# Patient Record
Sex: FEMALE | Race: WHITE
[De-identification: names, ages, dates, MRNs, and addresses within clinical notes are randomized per-mention and may not be internally consistent; named-entity substitution may affect disease eponyms.]

---

## 2019-10-09 ENCOUNTER — HOSPITAL ENCOUNTER (OUTPATIENT)
Dept: HOSPITAL 88 - US | Age: 79
End: 2019-10-09
Attending: UROLOGY
Payer: MEDICARE

## 2019-10-09 DIAGNOSIS — N39.0: Primary | ICD-10-CM

## 2019-10-09 PROCEDURE — 74018 RADEX ABDOMEN 1 VIEW: CPT

## 2019-10-09 PROCEDURE — 76770 US EXAM ABDO BACK WALL COMP: CPT

## 2019-10-09 NOTE — DIAGNOSTIC IMAGING REPORT
Exam: KUB - 2 views



Indication: Urinary tract infection



Comparison: CT chest abdomen pelvis of 6/26/2011



Findings: 

No radiographically apparent renal calculi. Extensive stool burden throughout

the colon. Status post cholecystectomy. No free air. Extensive degenerative

changes of the lumbar spine. Phleboliths in the pelvis.



Impression:

No radiographically apparent renal calculi



Signed by: Cedrick Freeman MD on 10/9/2019 2:10 PM

## 2019-10-09 NOTE — DIAGNOSTIC IMAGING REPORT
EXAM: Renal Ultrasound

INDICATION:        

^URINARY TRACT INFECTION  

COMPARISON: CT chest abdomen and pelvis of 6/26/2011 

TECHNIQUE: Transverse and longitudinal images of the kidneys and bladder were

obtained.  



FINDINGS:     



Right Kidney: 

Length: 8.0 cm

Appearance: Normal echogenicity.  

Collecting system: No hydronephrosis

Stones: None

Cyst/Mass: None



Left Kidney: 

Length: 9.4 cm

Appearance: Normal echogenicity.  

Collecting system: No hydronephrosis

Stones: None

Cyst/Mass: None



Bladder: 

No mass or calculi. Prevoid volume estimate of 95 cc.



IMPRESSION:

No renal calculi or hydronephrosis.



Signed by: Cedrick Freeman MD on 10/9/2019 2:11 PM

## 2019-11-09 ENCOUNTER — HOSPITAL ENCOUNTER (INPATIENT)
Dept: HOSPITAL 88 - ER | Age: 79
LOS: 3 days | Discharge: HOME | DRG: 392 | End: 2019-11-12
Attending: INTERNAL MEDICINE | Admitting: INTERNAL MEDICINE
Payer: MEDICARE

## 2019-11-09 VITALS — BODY MASS INDEX: 29.45 KG/M2 | WEIGHT: 150 LBS | HEIGHT: 60 IN

## 2019-11-09 VITALS — SYSTOLIC BLOOD PRESSURE: 183 MMHG | DIASTOLIC BLOOD PRESSURE: 75 MMHG

## 2019-11-09 VITALS — DIASTOLIC BLOOD PRESSURE: 75 MMHG | SYSTOLIC BLOOD PRESSURE: 183 MMHG

## 2019-11-09 VITALS — DIASTOLIC BLOOD PRESSURE: 71 MMHG | SYSTOLIC BLOOD PRESSURE: 170 MMHG

## 2019-11-09 DIAGNOSIS — K57.20: Primary | ICD-10-CM

## 2019-11-09 DIAGNOSIS — Z88.5: ICD-10-CM

## 2019-11-09 DIAGNOSIS — J44.9: ICD-10-CM

## 2019-11-09 DIAGNOSIS — Z88.1: ICD-10-CM

## 2019-11-09 DIAGNOSIS — Z88.8: ICD-10-CM

## 2019-11-09 DIAGNOSIS — I10: ICD-10-CM

## 2019-11-09 DIAGNOSIS — F41.9: ICD-10-CM

## 2019-11-09 LAB
ALBUMIN SERPL-MCNC: 4.2 G/DL (ref 3.5–5)
ALBUMIN/GLOB SERPL: 1.4 {RATIO} (ref 0.8–2)
ALP SERPL-CCNC: 111 IU/L (ref 40–150)
ALT SERPL-CCNC: 15 IU/L (ref 0–55)
ANION GAP SERPL CALC-SCNC: 14.8 MMOL/L (ref 8–16)
BACTERIA URNS QL MICRO: (no result) /HPF
BASOPHILS # BLD AUTO: 0.1 10*3/UL (ref 0–0.1)
BASOPHILS NFR BLD AUTO: 0.6 % (ref 0–1)
BILIRUB UR QL: NEGATIVE
BUN SERPL-MCNC: 17 MG/DL (ref 7–26)
BUN/CREAT SERPL: 20 (ref 6–25)
CALCIUM SERPL-MCNC: 10.8 MG/DL (ref 8.4–10.2)
CHLORIDE SERPL-SCNC: 102 MMOL/L (ref 98–107)
CK MB SERPL-MCNC: 1.3 NG/ML (ref 0–5)
CK SERPL-CCNC: 34 IU/L (ref 29–168)
CLARITY UR: (no result)
CO2 SERPL-SCNC: 26 MMOL/L (ref 22–29)
COLOR UR: YELLOW
DEPRECATED NEUTROPHILS # BLD AUTO: 5 10*3/UL (ref 2.1–6.9)
DEPRECATED RBC URNS MANUAL-ACNC: (no result) /HPF (ref 0–5)
EGFRCR SERPLBLD CKD-EPI 2021: > 60 ML/MIN (ref 60–?)
EOSINOPHIL # BLD AUTO: 0.2 10*3/UL (ref 0–0.4)
EOSINOPHIL NFR BLD AUTO: 2.9 % (ref 0–6)
EPI CELLS URNS QL MICRO: (no result) /LPF
ERYTHROCYTE [DISTWIDTH] IN CORD BLOOD: 12.8 % (ref 11.7–14.4)
GLOBULIN PLAS-MCNC: 3 G/DL (ref 2.3–3.5)
GLUCOSE SERPLBLD-MCNC: 92 MG/DL (ref 74–118)
HCT VFR BLD AUTO: 48 % (ref 34.2–44.1)
HGB BLD-MCNC: 15.6 G/DL (ref 12–16)
KETONES UR QL STRIP.AUTO: NEGATIVE
LEUKOCYTE ESTERASE UR QL STRIP.AUTO: (no result)
LYMPHOCYTES # BLD: 2.2 10*3/UL (ref 1–3.2)
LYMPHOCYTES NFR BLD AUTO: 26.2 % (ref 18–39.1)
MCH RBC QN AUTO: 28.9 PG (ref 28–32)
MCHC RBC AUTO-ENTMCNC: 32.5 G/DL (ref 31–35)
MCV RBC AUTO: 89.1 FL (ref 81–99)
MONOCYTES # BLD AUTO: 0.8 10*3/UL (ref 0.2–0.8)
MONOCYTES NFR BLD AUTO: 10 % (ref 4.4–11.3)
NEUTS SEG NFR BLD AUTO: 59.8 % (ref 38.7–80)
NITRITE UR QL STRIP.AUTO: POSITIVE
PLATELET # BLD AUTO: 297 X10E3/UL (ref 140–360)
POTASSIUM SERPL-SCNC: 3.8 MMOL/L (ref 3.5–5.1)
PROT UR QL STRIP.AUTO: NEGATIVE
RBC # BLD AUTO: 5.39 X10E6/UL (ref 3.6–5.1)
SODIUM SERPL-SCNC: 139 MMOL/L (ref 136–145)
SP GR UR STRIP: 1.01 (ref 1.01–1.02)
UROBILINOGEN UR STRIP-MCNC: 0.2 MG/DL (ref 0.2–1)
WBC #/AREA URNS HPF: (no result) /HPF (ref 0–5)

## 2019-11-09 PROCEDURE — 85025 COMPLETE CBC W/AUTO DIFF WBC: CPT

## 2019-11-09 PROCEDURE — 81001 URINALYSIS AUTO W/SCOPE: CPT

## 2019-11-09 PROCEDURE — 84484 ASSAY OF TROPONIN QUANT: CPT

## 2019-11-09 PROCEDURE — 82550 ASSAY OF CK (CPK): CPT

## 2019-11-09 PROCEDURE — 99284 EMERGENCY DEPT VISIT MOD MDM: CPT

## 2019-11-09 PROCEDURE — 82553 CREATINE MB FRACTION: CPT

## 2019-11-09 PROCEDURE — 74176 CT ABD & PELVIS W/O CONTRAST: CPT

## 2019-11-09 PROCEDURE — 83690 ASSAY OF LIPASE: CPT

## 2019-11-09 PROCEDURE — 80053 COMPREHEN METABOLIC PANEL: CPT

## 2019-11-09 PROCEDURE — 36415 COLL VENOUS BLD VENIPUNCTURE: CPT

## 2019-11-09 PROCEDURE — 94640 AIRWAY INHALATION TREATMENT: CPT

## 2019-11-09 RX ADMIN — CLONIDINE HYDROCHLORIDE PRN MG: 0.1 TABLET ORAL at 21:30

## 2019-11-09 RX ADMIN — SODIUM CHLORIDE SCH MLS/HR: 9 INJECTION, SOLUTION INTRAVENOUS at 21:25

## 2019-11-09 RX ADMIN — BRIMONIDINE TARTRATE SCH ML: 1.5 SOLUTION/ DROPS OPHTHALMIC at 21:00

## 2019-11-09 RX ADMIN — TAZOBACTAM SODIUM AND PIPERACILLIN SODIUM SCH MLS/HR: 375; 3 INJECTION, SOLUTION INTRAVENOUS at 23:42

## 2019-11-09 RX ADMIN — BRINZOLAMIDE SCH ML: 10 SUSPENSION/ DROPS OPHTHALMIC at 21:00

## 2019-11-09 RX ADMIN — TAZOBACTAM SODIUM AND PIPERACILLIN SODIUM SCH MLS/HR: 375; 3 INJECTION, SOLUTION INTRAVENOUS at 18:11

## 2019-11-09 RX ADMIN — ATENOLOL SCH MG: 50 TABLET ORAL at 21:30

## 2019-11-09 NOTE — XMS REPORT
Patient Summary Document

                             Created on: 2019



BRAN SMART

External Reference #: [id number]

: 1940

Sex: Female



Demographics







                          Address                   [address] [place]  [address]

 

                          Home Phone                [phone]

 

                          Preferred Language        Unknown

 

                          Marital Status            Unknown

 

                          Confucianism Affiliation     Unknown

 

                          Race                      Unknown

 

                                        Additional Race(s)  

 

                          Ethnic Group              Unknown





Author







                          Author                    [facility]

 

                          [facility]

 

                          Address                   Unknown

 

                          Phone                     Unavailable







Support







                Name            Relationship    Address         Phone

 

                    CRISTIN MATTHEWS    PRS                 [address], [place]  [address] [phone]

 

                    BRAD SMART       PRS                 [address], [place]  [address] [phone]







Care Team Providers







                    Care Team Member Name    Role                Phone

 

                    BRENDA SILVA        Unavailable         Unavailable







Payers







             Payer Name    Policy Type    Policy Number    Effective Date    Expiration Date







Problems

This patient has no known problems.



Allergies, Adverse Reactions, Alerts







          Allergy Name    Allergy Type    Status    Severity    Reaction(s)    Onset Date    Inactive 

Date                      Treating Clinician        Comments

 

        albuterol    DA      Active    SV              2018 00:00:00                     

 

        ciprofloxacin    DA      Active    SV              2018 00:00:00                     

 

        salmeterol    DA      Active    SV              2018 00:00:00                     

 

        fluticasone    DA      Active    SV              2018 00:00:00                     

 

        formoterol    DA      Active    SV              2018 00:00:00                     

 

        erythromycin base    DA      Active    MI              2017 00:00:00                     

 

        morphine    DA      Active    SV              2016 00:00:00                     

 

        benzalkonium chloride    DA      Active    SV              2016 00:00:00                     

 

        levofloxacin    DA      Active    SV              2016 00:00:00                     







Medications

This patient has no known medications.



Results







           Test Description    Test Time    Test Comments    Text Results    Atomic Results    Result

 Comments

 

                US RENAL RETROPERITONEAL COMP    2019-10-09 14:10:00                                             

                                                             [address] [address]      Patient Name: BRAN SMART                        
          MR #: [id number]                     : 1940                  
                Age/Sex: 78/F  Acct #: [id number]                             
Req #: [id number]  Adm Physician:                                               
      Ordered by: BRENDA SILVA MD                            Report #: [id number] 
      Location: US                                      Room/Bed:               
     
___________________________________________________________________________________________________
   Procedure: [id number] US/US RENAL RETROPERITONEAL COMP  Exam Date:            
                Exam Time:                                               REPORT 
STATUS: Signed    EXAM: Renal Ultrasound   INDICATION:            URINARY TRACT 
INFECTION     COMPARISON: CT chest abdomen and pelvis of 2011    TECHNIQUE:
Transverse and longitudinal images of the kidneys and bladder were   obtained.  
     FINDINGS:           Right Kidney:    Length: 8.0 cm   Appearance: Normal 
echogenicity.     Collecting system: No hydronephrosis   Stones: None   
Cyst/Mass: None      Left Kidney:    Length: 9.4 cm   Appearance: Normal 
echogenicity.     Collecting system: No hydronephrosis   Stones: None   
Cyst/Mass: None      Bladder:    No mass or calculi. Prevoid volume estimate of 
95 cc.      IMPRESSION:   No renal calculi or hydronephrosis.      Signed by: 
Nicolette Craven MD on 10/9/2019 2:11 PM        Dictated By: NICOLETTE CRAVEN MD  
Electronically Signed By: NICOLETTE CRAVEN MD on 10/09/19 1411  Transcribed By: 
CHALINO on 10/09/19 1411       COPY TO:   BRENDA SILVA MD              

 

                [facility] (KUB)    2019-10-09 14:08:00                                                       

                                                   [place] [address]      Patient Name: BRAN SMART                                  
MR #: [id number]                     : 1940                            
      Age/Sex: 78/F  Acct #: [id number]                              Req #: 
[id number]  Adm Physician:                                                      
Ordered by: BRENDA SILVA MD                            Report #: [id number]       
Location:                                       Room/Bed:                     
___________________________________________________________________________________________________
   Procedure: [id number] DX/ABDOMEN-1VIEW (KUB)  Exam Date:                      
      Exam Time:                                               REPORT STATUS: 
Signed    Exam: KUB - 2 views      Indication: Urinary tract infection      Com
parison: CT chest abdomen pelvis of 2011      Findings:    No 
radiographically apparent renal calculi. Extensive stool burden throughout   the
colon. Status post cholecystectomy. No free air. Extensive degenerative   
changes of the lumbar spine. Phleboliths in the pelvis.      Impression:   No 
radiographically apparent renal calculi      Signed by: Nicolette Craven MD on 
10/9/2019 2:10 PM        Dictated By: NICOLETTE CRAVEN MD  Electronically Signed By: 
NICOLETTE CRAVEN MD on 10/09/19 1410  Transcribed By: CHALINO on 10/09/19 1410       
COPY TO:   BRENDA SILVA MD                 

 

                [facility]         2018 14:29:00                    --------------------------------------------------------------------------------------------RUN

 DATE: 18                         [facility]                          
PAGE 1   RUN TIME: 1429                            Specimen Inquiry             
      RUN USER: INTERFACE                                                       
   ----------------------------------------------------------------
----------------------------PATIENT: BRAN SMART       ACCT #: 
[id number] LOC:  NIGELU      U #: [id number]                                   
   AGE/SX: 77/F         ROOM:            RE18REG DR:  Audra Davis MD        :    [birth year]     BED:             DIS:                         
                      [place] [facility]      TLOC:           
----------------------------
---------------------------------------------------------------- SPEC #: 
[id number]     RECD:      STATUS:  NEO THOMAS #: 
[id number]                           MIKAEL:  DR: 
Audra Davis MD         ENTERED:      SP TYPE: STOMACH      
 OTHR DR: Earl Boyd MD            ORDERED:  GROSS                         
                                                    COPIES TO:   Earl Boyd MD   [address]   [place] [address]   [phone]    
Audra Davis MD   [address]   [place] [address]   [phone] PROCEDUR
ES: GROSS () TISSUES:           1. DUODENUM, NOS - BX         2. 
ANTRAL BIOPSY - H-PYLORI         3. ESOPHAGUS, NOS - BX         4. CECUM, NOS - 
BX         CLINICAL HISTORY    COLLECTION DATE:  2018       ROUTINE EXAM;  
HISTORY OF GERD   POST-OP DIAGNOSIS: DUODENITIS/HIATAL HERNIA/DIVERTICULOSIS    
    FINAL DIAGNOSIS    Small intestine, duodenum, cold biopsy:        SMALL 
INTESTINAL MUCOSA WITH EDEMATOUS CHANGES AND MILD TO MODERATE           INCREASE
IN CHRONIC INFLAMMATION        NO BLUNTING OF THE VILLI IDENTIFIED        NO 
ACUTE INFLAMMATORY INFILTRATES IDENTIFIED        NEGATIVE FOR MALIGNANCY        
CLINICALLY CORRELATE           Stomach, antrum/body, biopsy:        CHRONIC 
GASTRITIS WITH TWO PROMINENT LYMPHOID AGGREGATES        CONTROLLED GIEMSA STAIN 
NEGATIVE FOR HELICOBACTER ORGANISMS        NEGATIVE FOR INTESTINAL METAPLASIA 
AND DYSPLASIA        NEGATIVE FOR MALIGNANCY                                ** 
CONTINUED ON NEXT PAGE ** --------------------------------------
------------------------------------------------------RUN DATE: 18        
                [facility]                          PAGE 2   RUN TIME: 1429 
                          Specimen Inquiry                    RUN USER: 
INTERFACE                                                           
--------------------------------------------------------------------------------------------SPEC
#: :[id number]    PATIENT: BRAN SMART        #[id number]  
(Continued)--------------------------------------------------------------------------------------------
        FINAL DIAGNOSIS           (Continued)         CLINICALLY CORRELATE      
Lower esophagus, cold biopsy:        SQUAMOUS EPITHELIUM WITH RARE 
INTRAEPITHELIAL NEUTROPHILS        GASTRIC-TYPE MUCOSA WITH MODERATE CHRONIC IN
FLAMMATION AND FOCAL          ACUTE INFLAMMATION        NEGATIVE FOR INTESTINAL 
METAPLASIA AND DYSPLASIA        NEGATIVE FOR MALIGNANCY        CLINICALLY 
CORRELATE       Cecum polyp, cold biopsy:        COLONIC MUCOSA WITH A SMALL 
LYMPHOID AGGREGATE AND FOCAL HYPERPLASTIC          CHANGE SUGGESTIVE OF 
HYPERPLASTIC POLYP         NEGATIVE FOR MALIGNANCY       ODETTE/sm   D   [phone] 
[phone]           MACROSCOPIC    The first specimen is received in formalin, 
labeled with the patient's name,   identified as "Duodenum for duodenitis", and 
consists of red-pink biopsy tissue   measuring up to 0.35 cm in aggregate.  It 
is submitted as (1).       The second specimen is received in formalin, labeled 
with the patient's name,   identified as "Antrum/body", and consists of pink-tan
biopsy tissue measuring   up to 0.4 cm in aggregate.  It is submitted as (2) for
H E and Giemsa stains.       The third specimen is received in formalin, labeled
with the patient's name,   identified as "Lower esophagus cold BX", and consists
of pink biopsy tissue   measuring up to 0.3 cm in aggregate.  It is submitted as
(3).       The fourth specimen is received in formalin, labeled with the 
patient's name,   identified as "Cecum polyp cold BX", and consists of light tan
biopsy tissue   measuring up to 0.3 cm in aggregate.  It is submitted as (4).   
   GROSS PERFORMED AT [facility]   [address],   [place] [address]   [phone]                                 
   ** CONTINUED ON NEXT PAGE ** 
--------------------------------------------------------------------------------------
------RUN DATE: 18                         [facility]                 
        PAGE 3   RUN TIME: 1429                            Specimen Inquiry     
              RUN USER: INTERFACE                                               
           
--------------------------------------------------------------------------------------------SPEC
#: [id number]    PATIENT: SMARTBRANDELORES MASSEY        #[id number]  
(Continued)--------------------------------------------------------------------------------------------
           MICROSCOPIC    MICROSCOPIC PERFORMED AT [facility]       All 
of the stains, including any controls performed, stain appropriately.       
[facility]   [address],   [place] [address]   
[phone]         PERFORMING SITE    Diagnosis performed at:        
[facility]        [address] 
[place] [address]        
[phone]---------------------------------------------------------------------------
----------------- Signed SIGNATURE ON FILE                        RADAMES 
AbiCarlita 18 1429  
--------------------------------------------------------------------------------------------
                                                             ** END OF REPORT **

## 2019-11-09 NOTE — NUR
Patient visited in room during nursing rounds. Patient alert and oriented x3. Patient 
ambulatory prn. Pt requesting to take home meds tonight especially blood pressure meds. Dr. Boyd to be called tonight. Pt on scheduled IV antibiotics. Call bell within reach.

## 2019-11-09 NOTE — DIAGNOSTIC IMAGING REPORT
EXAM: CT Abdomen and Pelvis WITHOUT contrast  

INDICATION:      

^abd pain

^[id number]

^165 

COMPARISON: Renal ultrasound dated 10/9/2019 and CT dated 6/26/2011

TECHNIQUE: Abdomen and pelvis were scanned utilizing a multidetector helical

scanner from the lung base to the pubic symphysis without administration of IV

contrast. Absence of intravenous contrast decreases sensitivity for detection

of focal lesions and vascular pathology. Coronal and sagittal reformations were

obtained. Routine protocol was performed. 

     IV CONTRAST: None

     ORAL CONTRAST: Water

            

COMPLICATIONS: None



RADIATION DOSE:

     Total DLP: ... mGy*cm

     Estimated effective dose: (DLP x 0.015 x size factor) mSv

     CTDIvol has been reviewed. It is below the limits set by the Radiation

Protocol Committee (RPC).



FINDINGS:



LINES and TUBES: None.



LOWER THORAX:  Unremarkable



HEPATOBILIARY:      Unenhanced liver is unremarkable. No biliary ductal

dilation. 



GALLBLADDER: Surgically absent.



SPLEEN: No splenomegaly. 



PANCREAS: No focal masses or ductal dilatation.  



ADRENALS: No adrenal nodules    



KIDNEYS/URETERS:  No hydronephrosis. No cystic or solid mass lesions.  No

stones.



GI TRACT: No abnormal distention or evidence of bowel obstruction.  Mild wall

thickening and pericolonic stranding in left pelvis. Tiny adjacent gas bubble

such as in series 2, image 56. Appendix is normal. Large colonic stool burden.



PELVIC ORGANS/BLADDER: Unremarkable.



LYMPH NODES: No lymphadenopathy.



VESSELS: Mild to moderate aortoiliac atherosclerotic disease.



PERITONEUM / RETROPERITONEUM: No fluid.



BONES: Mild scoliosis and multilevel degenerative changes of lumbar spine.



SOFT TISSUES: Unremarkable.            



IMPRESSION: 

1.  Limited study without intravenous contrast.

2.  Mild sigmoid wall thickening and pericolonic fat stranding in left lower

quadrant/pelvis, representing mild diverticulitis. Tiny pericolonic foci of

air, could represent air within diverticula versus microperforation. No

evidence of abscess formation.



Signed by: Dr. Rajesh Molina MD on 11/9/2019 5:37 PM

## 2019-11-10 VITALS — SYSTOLIC BLOOD PRESSURE: 160 MMHG | DIASTOLIC BLOOD PRESSURE: 71 MMHG

## 2019-11-10 VITALS — SYSTOLIC BLOOD PRESSURE: 165 MMHG | DIASTOLIC BLOOD PRESSURE: 72 MMHG

## 2019-11-10 VITALS — DIASTOLIC BLOOD PRESSURE: 67 MMHG | SYSTOLIC BLOOD PRESSURE: 148 MMHG

## 2019-11-10 VITALS — SYSTOLIC BLOOD PRESSURE: 127 MMHG | DIASTOLIC BLOOD PRESSURE: 59 MMHG

## 2019-11-10 VITALS — SYSTOLIC BLOOD PRESSURE: 158 MMHG | DIASTOLIC BLOOD PRESSURE: 73 MMHG

## 2019-11-10 VITALS — DIASTOLIC BLOOD PRESSURE: 84 MMHG | SYSTOLIC BLOOD PRESSURE: 138 MMHG

## 2019-11-10 RX ADMIN — SODIUM CHLORIDE SCH MLS/HR: 9 INJECTION, SOLUTION INTRAVENOUS at 23:25

## 2019-11-10 RX ADMIN — BRIMONIDINE TARTRATE SCH ML: 1.5 SOLUTION/ DROPS OPHTHALMIC at 09:00

## 2019-11-10 RX ADMIN — AMOXICILLIN SCH MG: 250 CAPSULE ORAL at 13:38

## 2019-11-10 RX ADMIN — ATENOLOL SCH MG: 50 TABLET ORAL at 21:00

## 2019-11-10 RX ADMIN — SODIUM CHLORIDE SCH MLS/HR: 9 INJECTION, SOLUTION INTRAVENOUS at 13:16

## 2019-11-10 RX ADMIN — BRINZOLAMIDE SCH ML: 10 SUSPENSION/ DROPS OPHTHALMIC at 09:00

## 2019-11-10 RX ADMIN — TAZOBACTAM SODIUM AND PIPERACILLIN SODIUM SCH MLS/HR: 375; 3 INJECTION, SOLUTION INTRAVENOUS at 05:43

## 2019-11-10 RX ADMIN — BRIMONIDINE TARTRATE SCH ML: 1.5 SOLUTION/ DROPS OPHTHALMIC at 15:00

## 2019-11-10 RX ADMIN — BRIMONIDINE TARTRATE SCH ML: 1.5 SOLUTION/ DROPS OPHTHALMIC at 21:00

## 2019-11-10 RX ADMIN — METRONIDAZOLE SCH MG: 500 TABLET ORAL at 21:48

## 2019-11-10 RX ADMIN — BRINZOLAMIDE SCH ML: 10 SUSPENSION/ DROPS OPHTHALMIC at 21:00

## 2019-11-10 RX ADMIN — AMOXICILLIN SCH MG: 250 CAPSULE ORAL at 21:48

## 2019-11-10 RX ADMIN — BRINZOLAMIDE SCH ML: 10 SUSPENSION/ DROPS OPHTHALMIC at 15:00

## 2019-11-10 RX ADMIN — METRONIDAZOLE SCH MG: 500 TABLET ORAL at 13:38

## 2019-11-10 RX ADMIN — Medication PRN ML: at 07:20

## 2019-11-10 NOTE — NUR
received am report and rounds done. pt is alert and oob, no s/s of distress. call light 
within reach and instructed pt to call nurse for help.

## 2019-11-10 NOTE — NUR
Called answering service for Dr. Guajardo and informed of order for consult. Reason for 
consult is abdominal pain. Answering service to relay message to MD on call.

## 2019-11-10 NOTE — NUR
Dr. Boyd rounded and saw pt in room. MD aware of patient condition. MD ordered to change 
patient's diet to full liquid from clear liquid. MD also ordered to consult Dr. Guajardo (for 
Abd pain).

## 2019-11-10 NOTE — NUR
Patient visited in room during nursing rounds. Patient alert and oriented x3. Patient 
ambulatory prn. Pt refuses IVF to be continued and stated she has been drinking water a lot. 
Pt on scheduled PO antibiotics. Call bell within reach.

## 2019-11-11 VITALS — SYSTOLIC BLOOD PRESSURE: 151 MMHG | DIASTOLIC BLOOD PRESSURE: 68 MMHG

## 2019-11-11 VITALS — DIASTOLIC BLOOD PRESSURE: 63 MMHG | SYSTOLIC BLOOD PRESSURE: 133 MMHG

## 2019-11-11 VITALS — DIASTOLIC BLOOD PRESSURE: 63 MMHG | SYSTOLIC BLOOD PRESSURE: 135 MMHG

## 2019-11-11 VITALS — DIASTOLIC BLOOD PRESSURE: 76 MMHG | SYSTOLIC BLOOD PRESSURE: 200 MMHG

## 2019-11-11 VITALS — DIASTOLIC BLOOD PRESSURE: 86 MMHG | SYSTOLIC BLOOD PRESSURE: 225 MMHG

## 2019-11-11 VITALS — SYSTOLIC BLOOD PRESSURE: 128 MMHG | DIASTOLIC BLOOD PRESSURE: 64 MMHG

## 2019-11-11 VITALS — DIASTOLIC BLOOD PRESSURE: 81 MMHG | SYSTOLIC BLOOD PRESSURE: 187 MMHG

## 2019-11-11 VITALS — SYSTOLIC BLOOD PRESSURE: 145 MMHG | DIASTOLIC BLOOD PRESSURE: 60 MMHG

## 2019-11-11 VITALS — DIASTOLIC BLOOD PRESSURE: 71 MMHG | SYSTOLIC BLOOD PRESSURE: 175 MMHG

## 2019-11-11 RX ADMIN — BRIMONIDINE TARTRATE SCH ML: 1.5 SOLUTION/ DROPS OPHTHALMIC at 08:45

## 2019-11-11 RX ADMIN — SODIUM CHLORIDE SCH MLS/HR: 9 INJECTION, SOLUTION INTRAVENOUS at 15:15

## 2019-11-11 RX ADMIN — TAZOBACTAM SODIUM AND PIPERACILLIN SODIUM SCH MLS/HR: 375; 3 INJECTION, SOLUTION INTRAVENOUS at 14:08

## 2019-11-11 RX ADMIN — CLONIDINE HYDROCHLORIDE PRN MG: 0.1 TABLET ORAL at 04:53

## 2019-11-11 RX ADMIN — ATENOLOL SCH MG: 50 TABLET ORAL at 21:11

## 2019-11-11 RX ADMIN — TAZOBACTAM SODIUM AND PIPERACILLIN SODIUM SCH MLS/HR: 375; 3 INJECTION, SOLUTION INTRAVENOUS at 21:22

## 2019-11-11 RX ADMIN — BRINZOLAMIDE SCH ML: 10 SUSPENSION/ DROPS OPHTHALMIC at 08:45

## 2019-11-11 RX ADMIN — METRONIDAZOLE SCH MG: 500 TABLET ORAL at 06:33

## 2019-11-11 RX ADMIN — BRIMONIDINE TARTRATE SCH ML: 1.5 SOLUTION/ DROPS OPHTHALMIC at 21:22

## 2019-11-11 RX ADMIN — CLONIDINE HYDROCHLORIDE PRN MG: 0.1 TABLET ORAL at 21:11

## 2019-11-11 RX ADMIN — Medication PRN ML: at 21:35

## 2019-11-11 RX ADMIN — BRINZOLAMIDE SCH ML: 10 SUSPENSION/ DROPS OPHTHALMIC at 21:22

## 2019-11-11 RX ADMIN — BRINZOLAMIDE SCH ML: 10 SUSPENSION/ DROPS OPHTHALMIC at 14:19

## 2019-11-11 RX ADMIN — METRONIDAZOLE SCH MG: 500 TABLET ORAL at 14:08

## 2019-11-11 RX ADMIN — BRIMONIDINE TARTRATE SCH ML: 1.5 SOLUTION/ DROPS OPHTHALMIC at 14:18

## 2019-11-11 RX ADMIN — METRONIDAZOLE SCH MG: 500 TABLET ORAL at 21:12

## 2019-11-11 RX ADMIN — TAZOBACTAM SODIUM AND PIPERACILLIN SODIUM SCH MLS/HR: 375; 3 INJECTION, SOLUTION INTRAVENOUS at 06:33

## 2019-11-11 NOTE — NUR
BEDSIDE SHIFT REPORT GIVEN TO ONCOMING NURSE. PATIENT IS RESTING IN BED. NO ACUTE DISTRESS 
NOTED. CALL LIGHT WITHIN REACH. BED IN THE LOWEST POSITION.

## 2019-11-11 NOTE — NUR
Notified Dr. Boyd regarding patient's continued elevated BP despite administration of 
scheduled atenolol and PRN clonidine. Received orders for one time dose of 10 mg hydralazine 
IV. Will administer and continue to monitor patient.

## 2019-11-11 NOTE — NUR
BEDSIDE SHIFT REPORT RECEIVED FROM OFF-GOING NURSE. PATIENT IS RESTING IN BED. NO ACUTE 
DISTRESS NOTED. CALL LIGHT WITHIN REACH. BED IN THE LOWEST POSITION.

## 2019-11-12 VITALS — DIASTOLIC BLOOD PRESSURE: 79 MMHG | SYSTOLIC BLOOD PRESSURE: 197 MMHG

## 2019-11-12 VITALS — DIASTOLIC BLOOD PRESSURE: 77 MMHG | SYSTOLIC BLOOD PRESSURE: 189 MMHG

## 2019-11-12 VITALS — DIASTOLIC BLOOD PRESSURE: 75 MMHG | SYSTOLIC BLOOD PRESSURE: 195 MMHG

## 2019-11-12 VITALS — SYSTOLIC BLOOD PRESSURE: 144 MMHG | DIASTOLIC BLOOD PRESSURE: 68 MMHG

## 2019-11-12 NOTE — NUR
Patient reports feeling anxious, requested that IV fluids be stopped temporarily. All safety 
measures in place. Family at bedside. Will continue to monitor.

## 2019-11-12 NOTE — NUR
Patient walking downstairs with PCA, departing via private auto. In stable condition, no s/s 
of distress or c/o pain at this time.

## 2019-11-12 NOTE — NUR
currently at bedside, informed of situation. Patient resting in recliner chair. No 
s/s of distress or c/o pain noted at this time. Respirations even and unlabored. All safety 
measures in place. Instructed to call for assistance if needed. Will continue to monitor.

## 2019-11-12 NOTE — NUR
Patient okay to DC home per Dr. Boyd. Patient in stable condition, no s/s of distress or 
c/o pain at this time.  at bedside. All safety measures in place.

## 2019-11-13 NOTE — DISCHARGE SUMMARY
DISCHARGE DIAGNOSES:  

1. Acute sigmoid diverticulitis.

2. Chronic obstructive pulmonary disease.

3. Hypertension.

 

HISTORY OF PRESENT ILLNESS/HOSPITAL COURSE:  Please see also chart for full details. 

 

The patient is a 79-year-old lady, who presented with abdominal pain, was found on CT

scan to have acute sigmoid diverticulitis, so she was admitted, placed on IV

antibiotics, was seen by GI and she had significant improvement each day that at the

time of discharge, she was completely pain free.  She was ambulating well and eating

well without any issues, so she was discharged home with p.o. antibiotics of Flagyl and

amoxicillin to finish out for 10 more days.  She is to 

follow up with Dr. Guajardo tomorrow for her outpatient elective EGD.  Please see

hospital chart for full details. 

 

 

 

 

______________________________

MD PABLO Locke/ANDRIA

D:  11/12/2019 06:00:51

T:  11/13/2019 03:16:35

Job #:  [id number]

## 2020-01-23 LAB
ANION GAP SERPL CALC-SCNC: 12.3 MMOL/L (ref 8–16)
BASOPHILS # BLD AUTO: 0.1 10*3/UL (ref 0–0.1)
BASOPHILS NFR BLD AUTO: 1.1 % (ref 0–1)
BUN SERPL-MCNC: 22 MG/DL (ref 7–26)
BUN/CREAT SERPL: 29 (ref 6–25)
CALCIUM SERPL-MCNC: 10 MG/DL (ref 8.4–10.2)
CHLORIDE SERPL-SCNC: 111 MMOL/L (ref 98–107)
CO2 SERPL-SCNC: 20 MMOL/L (ref 22–29)
DEPRECATED NEUTROPHILS # BLD AUTO: 2 10*3/UL (ref 2.1–6.9)
EGFRCR SERPLBLD CKD-EPI 2021: > 60 ML/MIN (ref 60–?)
EOSINOPHIL # BLD AUTO: 0.3 10*3/UL (ref 0–0.4)
EOSINOPHIL NFR BLD AUTO: 5.5 % (ref 0–6)
ERYTHROCYTE [DISTWIDTH] IN CORD BLOOD: 12.7 % (ref 11.7–14.4)
GLUCOSE SERPLBLD-MCNC: 99 MG/DL (ref 74–118)
HCT VFR BLD AUTO: 42.8 % (ref 34.2–44.1)
HGB BLD-MCNC: 14.2 G/DL (ref 12–16)
LYMPHOCYTES # BLD: 2.3 10*3/UL (ref 1–3.2)
LYMPHOCYTES NFR BLD AUTO: 42.5 % (ref 18–39.1)
MCH RBC QN AUTO: 29.6 PG (ref 28–32)
MCHC RBC AUTO-ENTMCNC: 33.2 G/DL (ref 31–35)
MCV RBC AUTO: 89.2 FL (ref 81–99)
MONOCYTES # BLD AUTO: 0.7 10*3/UL (ref 0.2–0.8)
MONOCYTES NFR BLD AUTO: 12.7 % (ref 4.4–11.3)
NEUTS SEG NFR BLD AUTO: 37.8 % (ref 38.7–80)
PLATELET # BLD AUTO: 279 X10E3/UL (ref 140–360)
POTASSIUM SERPL-SCNC: 4.3 MMOL/L (ref 3.5–5.1)
RBC # BLD AUTO: 4.8 X10E6/UL (ref 3.6–5.1)
SODIUM SERPL-SCNC: 139 MMOL/L (ref 136–145)

## 2020-01-23 NOTE — DIAGNOSTIC IMAGING REPORT
Chest, 2 views,  1/23/2020.   

 



History: Preop, bladder surgery.



Comparison: None available.



Findings: The cardiomediastinal silhouette and pulmonary vasculature are within

normal limits. The lungs are clear without evidence of consolidation or pleural

effusion. Degenerative changes are noted in the thoracic spine. There are no

acute osseous or soft tissue abnormalities. 



Impression: 

No acute cardiopulmonary abnormality.



Signed by: Brandt Scott on 1/23/2020 2:07 PM

## 2020-01-24 ENCOUNTER — HOSPITAL ENCOUNTER (OUTPATIENT)
Dept: HOSPITAL 88 - OR | Age: 80
Discharge: HOME | End: 2020-01-24
Attending: UROLOGY
Payer: MEDICARE

## 2020-01-24 VITALS — DIASTOLIC BLOOD PRESSURE: 86 MMHG | SYSTOLIC BLOOD PRESSURE: 148 MMHG

## 2020-01-24 DIAGNOSIS — Z01.818: ICD-10-CM

## 2020-01-24 DIAGNOSIS — G47.30: ICD-10-CM

## 2020-01-24 DIAGNOSIS — J45.909: ICD-10-CM

## 2020-01-24 DIAGNOSIS — N30.10: Primary | ICD-10-CM

## 2020-01-24 DIAGNOSIS — N36.41: ICD-10-CM

## 2020-01-24 DIAGNOSIS — I10: ICD-10-CM

## 2020-01-24 DIAGNOSIS — Z01.812: ICD-10-CM

## 2020-01-24 DIAGNOSIS — N39.46: ICD-10-CM

## 2020-01-24 DIAGNOSIS — R35.1: ICD-10-CM

## 2020-01-24 DIAGNOSIS — N81.89: ICD-10-CM

## 2020-01-24 DIAGNOSIS — N32.81: ICD-10-CM

## 2020-01-24 DIAGNOSIS — Z85.828: ICD-10-CM

## 2020-01-24 DIAGNOSIS — N35.92: ICD-10-CM

## 2020-01-24 DIAGNOSIS — N95.2: ICD-10-CM

## 2020-01-24 DIAGNOSIS — Z01.810: ICD-10-CM

## 2020-01-24 DIAGNOSIS — K21.9: ICD-10-CM

## 2020-01-24 PROCEDURE — 71046 X-RAY EXAM CHEST 2 VIEWS: CPT

## 2020-01-24 PROCEDURE — 93005 ELECTROCARDIOGRAM TRACING: CPT

## 2020-01-24 PROCEDURE — 80048 BASIC METABOLIC PNL TOTAL CA: CPT

## 2020-01-24 PROCEDURE — 52260 CYSTOSCOPY AND TREATMENT: CPT

## 2020-01-24 PROCEDURE — 36415 COLL VENOUS BLD VENIPUNCTURE: CPT

## 2020-01-24 PROCEDURE — 74420 UROGRAPHY RTRGR +-KUB: CPT

## 2020-01-24 PROCEDURE — 85025 COMPLETE CBC W/AUTO DIFF WBC: CPT

## 2020-01-26 ENCOUNTER — HOSPITAL ENCOUNTER (INPATIENT)
Dept: HOSPITAL 88 - ER | Age: 80
LOS: 6 days | Discharge: HOME | DRG: 190 | End: 2020-02-01
Attending: INTERNAL MEDICINE | Admitting: INTERNAL MEDICINE
Payer: MEDICARE

## 2020-01-26 VITALS — WEIGHT: 154.13 LBS | BODY MASS INDEX: 30.26 KG/M2 | HEIGHT: 60 IN

## 2020-01-26 VITALS — DIASTOLIC BLOOD PRESSURE: 65 MMHG | SYSTOLIC BLOOD PRESSURE: 140 MMHG

## 2020-01-26 DIAGNOSIS — J96.01: ICD-10-CM

## 2020-01-26 DIAGNOSIS — J18.9: ICD-10-CM

## 2020-01-26 DIAGNOSIS — J44.1: ICD-10-CM

## 2020-01-26 DIAGNOSIS — K21.9: ICD-10-CM

## 2020-01-26 DIAGNOSIS — E03.9: ICD-10-CM

## 2020-01-26 DIAGNOSIS — E66.9: ICD-10-CM

## 2020-01-26 DIAGNOSIS — Z88.1: ICD-10-CM

## 2020-01-26 DIAGNOSIS — B96.20: ICD-10-CM

## 2020-01-26 DIAGNOSIS — Z82.49: ICD-10-CM

## 2020-01-26 DIAGNOSIS — N30.01: ICD-10-CM

## 2020-01-26 DIAGNOSIS — I10: ICD-10-CM

## 2020-01-26 DIAGNOSIS — J44.0: Primary | ICD-10-CM

## 2020-01-26 DIAGNOSIS — Z88.8: ICD-10-CM

## 2020-01-26 DIAGNOSIS — F41.9: ICD-10-CM

## 2020-01-26 DIAGNOSIS — Z88.5: ICD-10-CM

## 2020-01-26 DIAGNOSIS — Z83.3: ICD-10-CM

## 2020-01-26 LAB
ALBUMIN SERPL-MCNC: 3.5 G/DL (ref 3.5–5)
ALBUMIN/GLOB SERPL: 1.5 {RATIO} (ref 0.8–2)
ALP SERPL-CCNC: 91 IU/L (ref 40–150)
ALT SERPL-CCNC: 15 IU/L (ref 0–55)
ANION GAP SERPL CALC-SCNC: 13.4 MMOL/L (ref 8–16)
BACTERIA URNS QL MICRO: (no result) /HPF
BASOPHILS # BLD AUTO: 0 10*3/UL (ref 0–0.1)
BASOPHILS NFR BLD AUTO: 0.2 % (ref 0–1)
BILIRUB UR QL: NEGATIVE
BUN SERPL-MCNC: 23 MG/DL (ref 7–26)
BUN/CREAT SERPL: 31 (ref 6–25)
CALCIUM SERPL-MCNC: 9.9 MG/DL (ref 8.4–10.2)
CHLORIDE SERPL-SCNC: 111 MMOL/L (ref 98–107)
CK MB SERPL-MCNC: 1.1 NG/ML (ref 0–5)
CK SERPL-CCNC: 22 IU/L (ref 29–168)
CLARITY UR: (no result)
CO2 SERPL-SCNC: 20 MMOL/L (ref 22–29)
COLOR UR: YELLOW
DEPRECATED APTT PLAS QN: 37.7 SECONDS (ref 23.8–35.5)
DEPRECATED INR PLAS: 0.86
DEPRECATED NEUTROPHILS # BLD AUTO: 6.7 10*3/UL (ref 2.1–6.9)
EGFRCR SERPLBLD CKD-EPI 2021: > 60 ML/MIN (ref 60–?)
EOSINOPHIL # BLD AUTO: 0 10*3/UL (ref 0–0.4)
EOSINOPHIL NFR BLD AUTO: 0.1 % (ref 0–6)
EPI CELLS URNS QL MICRO: (no result) /LPF
ERYTHROCYTE [DISTWIDTH] IN CORD BLOOD: 13 % (ref 11.7–14.4)
GLOBULIN PLAS-MCNC: 2.3 G/DL (ref 2.3–3.5)
GLUCOSE SERPLBLD-MCNC: 133 MG/DL (ref 74–118)
HCT VFR BLD AUTO: 38.1 % (ref 34.2–44.1)
HGB BLD-MCNC: 12.5 G/DL (ref 12–16)
KETONES UR QL STRIP.AUTO: NEGATIVE
LEUKOCYTE ESTERASE UR QL STRIP.AUTO: NEGATIVE
LYMPHOCYTES # BLD: 0.8 10*3/UL (ref 1–3.2)
LYMPHOCYTES NFR BLD AUTO: 9.9 % (ref 18–39.1)
MCH RBC QN AUTO: 29.6 PG (ref 28–32)
MCHC RBC AUTO-ENTMCNC: 32.8 G/DL (ref 31–35)
MCV RBC AUTO: 90.1 FL (ref 81–99)
MONOCYTES # BLD AUTO: 0.5 10*3/UL (ref 0.2–0.8)
MONOCYTES NFR BLD AUTO: 6.1 % (ref 4.4–11.3)
NEUTS SEG NFR BLD AUTO: 83.1 % (ref 38.7–80)
NITRITE UR QL STRIP.AUTO: POSITIVE
PLATELET # BLD AUTO: 277 X10E3/UL (ref 140–360)
POTASSIUM SERPL-SCNC: 4.4 MMOL/L (ref 3.5–5.1)
PROT UR QL STRIP.AUTO: NEGATIVE
PROTHROMBIN TIME: 12.2 SECONDS (ref 11.9–14.5)
RBC # BLD AUTO: 4.23 X10E6/UL (ref 3.6–5.1)
SODIUM SERPL-SCNC: 140 MMOL/L (ref 136–145)
SP GR UR STRIP: 1.02 (ref 1.01–1.02)
UROBILINOGEN UR STRIP-MCNC: 0.2 MG/DL (ref 0.2–1)
WBC #/AREA URNS HPF: (no result) /HPF (ref 0–5)

## 2020-01-26 PROCEDURE — 85025 COMPLETE CBC W/AUTO DIFF WBC: CPT

## 2020-01-26 PROCEDURE — 84484 ASSAY OF TROPONIN QUANT: CPT

## 2020-01-26 PROCEDURE — 82553 CREATINE MB FRACTION: CPT

## 2020-01-26 PROCEDURE — 87040 BLOOD CULTURE FOR BACTERIA: CPT

## 2020-01-26 PROCEDURE — 87086 URINE CULTURE/COLONY COUNT: CPT

## 2020-01-26 PROCEDURE — 71046 X-RAY EXAM CHEST 2 VIEWS: CPT

## 2020-01-26 PROCEDURE — 85610 PROTHROMBIN TIME: CPT

## 2020-01-26 PROCEDURE — 80061 LIPID PANEL: CPT

## 2020-01-26 PROCEDURE — 82550 ASSAY OF CK (CPK): CPT

## 2020-01-26 PROCEDURE — 83880 ASSAY OF NATRIURETIC PEPTIDE: CPT

## 2020-01-26 PROCEDURE — 87186 SC STD MICRODIL/AGAR DIL: CPT

## 2020-01-26 PROCEDURE — 99284 EMERGENCY DEPT VISIT MOD MDM: CPT

## 2020-01-26 PROCEDURE — 81001 URINALYSIS AUTO W/SCOPE: CPT

## 2020-01-26 PROCEDURE — 85730 THROMBOPLASTIN TIME PARTIAL: CPT

## 2020-01-26 PROCEDURE — 80053 COMPREHEN METABOLIC PANEL: CPT

## 2020-01-26 PROCEDURE — 93005 ELECTROCARDIOGRAM TRACING: CPT

## 2020-01-26 PROCEDURE — 36415 COLL VENOUS BLD VENIPUNCTURE: CPT

## 2020-01-26 PROCEDURE — 93306 TTE W/DOPPLER COMPLETE: CPT

## 2020-01-26 PROCEDURE — 94640 AIRWAY INHALATION TREATMENT: CPT

## 2020-01-26 PROCEDURE — 36569 INSJ PICC 5 YR+ W/O IMAGING: CPT

## 2020-01-26 RX ADMIN — TAZOBACTAM SODIUM AND PIPERACILLIN SODIUM SCH MLS/HR: 375; 3 INJECTION, SOLUTION INTRAVENOUS at 17:50

## 2020-01-26 RX ADMIN — METHYLPREDNISOLONE SODIUM SUCCINATE SCH MG: 125 INJECTION, POWDER, FOR SOLUTION INTRAMUSCULAR; INTRAVENOUS at 22:15

## 2020-01-26 NOTE — DIAGNOSTIC IMAGING REPORT
EXAMINATION:  CHEST 2 VIEWS    



INDICATION: Shortness of breath, cough.



COMPARISON:  Chest radiograph 1/23/2020.

     

FINDINGS:

TUBES and LINES:  None.



LUNGS:  Lungs are well inflated. Mild patchy right basilar opacity. No evidence

of pulmonary edema.



PLEURA:  No pleural effusion or pneumothorax. 



HEART AND MEDIASTINUM:  The cardiomediastinal silhouette is unremarkable.    



BONES AND SOFT TISSUES:  No acute osseous abnormality.



UPPER ABDOMEN: No free air under the diaphragm.    



IMPRESSION: 

Mild patchy right basilar opacity, which may reflect atelectasis or early

pneumonia in the appropriate clinical setting. Suggest follow-up chest

radiograph in 6-8 weeks to assess for resolution.



Signed by: Dr. Anton Floyd MD on 1/26/2020 5:37 PM

## 2020-01-27 VITALS — DIASTOLIC BLOOD PRESSURE: 67 MMHG | SYSTOLIC BLOOD PRESSURE: 153 MMHG

## 2020-01-27 VITALS — DIASTOLIC BLOOD PRESSURE: 78 MMHG | SYSTOLIC BLOOD PRESSURE: 190 MMHG

## 2020-01-27 VITALS — SYSTOLIC BLOOD PRESSURE: 142 MMHG | DIASTOLIC BLOOD PRESSURE: 63 MMHG

## 2020-01-27 VITALS — SYSTOLIC BLOOD PRESSURE: 190 MMHG | DIASTOLIC BLOOD PRESSURE: 83 MMHG

## 2020-01-27 VITALS — DIASTOLIC BLOOD PRESSURE: 70 MMHG | SYSTOLIC BLOOD PRESSURE: 140 MMHG

## 2020-01-27 VITALS — DIASTOLIC BLOOD PRESSURE: 78 MMHG | SYSTOLIC BLOOD PRESSURE: 155 MMHG

## 2020-01-27 VITALS — DIASTOLIC BLOOD PRESSURE: 77 MMHG | SYSTOLIC BLOOD PRESSURE: 197 MMHG

## 2020-01-27 LAB
ALBUMIN SERPL-MCNC: 3.3 G/DL (ref 3.5–5)
ALBUMIN/GLOB SERPL: 1.5 {RATIO} (ref 0.8–2)
ALP SERPL-CCNC: 86 IU/L (ref 40–150)
ALT SERPL-CCNC: 16 IU/L (ref 0–55)
ANION GAP SERPL CALC-SCNC: 11.7 MMOL/L (ref 8–16)
BASOPHILS # BLD AUTO: 0 10*3/UL (ref 0–0.1)
BASOPHILS NFR BLD AUTO: 0.2 % (ref 0–1)
BUN SERPL-MCNC: 18 MG/DL (ref 7–26)
BUN/CREAT SERPL: 26 (ref 6–25)
CALCIUM SERPL-MCNC: 9.6 MG/DL (ref 8.4–10.2)
CHLORIDE SERPL-SCNC: 109 MMOL/L (ref 98–107)
CHOLEST SERPL-MCNC: 203 MD/DL (ref 0–199)
CHOLEST/HDLC SERPL: 4.2 {RATIO} (ref 3–3.6)
CK MB SERPL-MCNC: 0.8 NG/ML (ref 0–5)
CK MB SERPL-MCNC: 0.9 NG/ML (ref 0–5)
CK SERPL-CCNC: 20 IU/L (ref 29–168)
CK SERPL-CCNC: 25 IU/L (ref 29–168)
CO2 SERPL-SCNC: 22 MMOL/L (ref 22–29)
DEPRECATED NEUTROPHILS # BLD AUTO: 4.1 10*3/UL (ref 2.1–6.9)
EGFRCR SERPLBLD CKD-EPI 2021: > 60 ML/MIN (ref 60–?)
EOSINOPHIL # BLD AUTO: 0 10*3/UL (ref 0–0.4)
EOSINOPHIL NFR BLD AUTO: 0 % (ref 0–6)
ERYTHROCYTE [DISTWIDTH] IN CORD BLOOD: 12.8 % (ref 11.7–14.4)
GLOBULIN PLAS-MCNC: 2.2 G/DL (ref 2.3–3.5)
GLUCOSE SERPLBLD-MCNC: 163 MG/DL (ref 74–118)
HCT VFR BLD AUTO: 37.7 % (ref 34.2–44.1)
HDLC SERPL-MSCNC: 48 MG/DL (ref 40–60)
HGB BLD-MCNC: 12.3 G/DL (ref 12–16)
LDLC SERPL CALC-MCNC: 132 MG/DL (ref 60–130)
LYMPHOCYTES # BLD: 0.8 10*3/UL (ref 1–3.2)
LYMPHOCYTES NFR BLD AUTO: 15.8 % (ref 18–39.1)
MCH RBC QN AUTO: 29.1 PG (ref 28–32)
MCHC RBC AUTO-ENTMCNC: 32.6 G/DL (ref 31–35)
MCV RBC AUTO: 89.1 FL (ref 81–99)
MONOCYTES # BLD AUTO: 0.1 10*3/UL (ref 0.2–0.8)
MONOCYTES NFR BLD AUTO: 1.8 % (ref 4.4–11.3)
NEUTS SEG NFR BLD AUTO: 81.6 % (ref 38.7–80)
PLATELET # BLD AUTO: 271 X10E3/UL (ref 140–360)
POTASSIUM SERPL-SCNC: 3.7 MMOL/L (ref 3.5–5.1)
RBC # BLD AUTO: 4.23 X10E6/UL (ref 3.6–5.1)
SODIUM SERPL-SCNC: 139 MMOL/L (ref 136–145)
TRIGL SERPL-MCNC: 116 MG/DL (ref 0–149)

## 2020-01-27 RX ADMIN — Medication SCH MG: at 10:05

## 2020-01-27 RX ADMIN — BRINZOLAMIDE SCH ML: 10 SUSPENSION/ DROPS OPHTHALMIC at 20:57

## 2020-01-27 RX ADMIN — METHYLPREDNISOLONE SODIUM SUCCINATE SCH MG: 125 INJECTION, POWDER, FOR SOLUTION INTRAMUSCULAR; INTRAVENOUS at 05:44

## 2020-01-27 RX ADMIN — TAZOBACTAM SODIUM AND PIPERACILLIN SODIUM SCH MLS/HR: 375; 3 INJECTION, SOLUTION INTRAVENOUS at 13:08

## 2020-01-27 RX ADMIN — BRIMONIDINE TARTRATE SCH ML: 1.5 SOLUTION/ DROPS OPHTHALMIC at 15:56

## 2020-01-27 RX ADMIN — BRINZOLAMIDE SCH ML: 10 SUSPENSION/ DROPS OPHTHALMIC at 15:56

## 2020-01-27 RX ADMIN — Medication PRN ML: at 14:55

## 2020-01-27 RX ADMIN — TAZOBACTAM SODIUM AND PIPERACILLIN SODIUM SCH MLS/HR: 375; 3 INJECTION, SOLUTION INTRAVENOUS at 18:17

## 2020-01-27 RX ADMIN — TAZOBACTAM SODIUM AND PIPERACILLIN SODIUM SCH MLS/HR: 375; 3 INJECTION, SOLUTION INTRAVENOUS at 00:05

## 2020-01-27 RX ADMIN — BENZONATATE PRN MG: 100 CAPSULE ORAL at 01:28

## 2020-01-27 RX ADMIN — BRIMONIDINE TARTRATE SCH ML: 1.5 SOLUTION/ DROPS OPHTHALMIC at 20:57

## 2020-01-27 RX ADMIN — BENZONATATE PRN MG: 100 CAPSULE ORAL at 23:15

## 2020-01-27 RX ADMIN — BENZONATATE PRN MG: 100 CAPSULE ORAL at 10:11

## 2020-01-27 RX ADMIN — Medication PRN ML: at 03:35

## 2020-01-27 RX ADMIN — Medication PRN ML: at 06:55

## 2020-01-27 RX ADMIN — ATENOLOL SCH MG: 50 TABLET ORAL at 20:58

## 2020-01-27 RX ADMIN — GUAIFENESIN AND DEXTROMETHORPHAN HYDROBROMIDE PRN EACH: 600; 30 TABLET, EXTENDED RELEASE ORAL at 20:58

## 2020-01-27 RX ADMIN — Medication PRN ML: at 22:30

## 2020-01-27 RX ADMIN — BRINZOLAMIDE SCH ML: 10 SUSPENSION/ DROPS OPHTHALMIC at 10:12

## 2020-01-27 RX ADMIN — TAZOBACTAM SODIUM AND PIPERACILLIN SODIUM SCH MLS/HR: 375; 3 INJECTION, SOLUTION INTRAVENOUS at 05:41

## 2020-01-27 RX ADMIN — CLONIDINE HYDROCHLORIDE SCH MG: 0.1 TABLET ORAL at 10:04

## 2020-01-27 RX ADMIN — METHYLPREDNISOLONE SODIUM SUCCINATE SCH MG: 125 INJECTION, POWDER, FOR SOLUTION INTRAMUSCULAR; INTRAVENOUS at 13:15

## 2020-01-27 RX ADMIN — BRIMONIDINE TARTRATE SCH ML: 1.5 SOLUTION/ DROPS OPHTHALMIC at 10:12

## 2020-01-27 RX ADMIN — Medication PRN ML: at 10:30

## 2020-01-27 RX ADMIN — THYROID, PORCINE SCH MG: 60 TABLET ORAL at 10:06

## 2020-01-27 RX ADMIN — Medication SCH TAB: at 10:05

## 2020-01-27 RX ADMIN — CLONIDINE HYDROCHLORIDE PRN MG: 0.1 TABLET ORAL at 05:47

## 2020-01-27 RX ADMIN — ATENOLOL SCH MG: 50 TABLET ORAL at 00:05

## 2020-01-27 RX ADMIN — BICTEGRAVIR SODIUM, EMTRICITABINE, AND TENOFOVIR ALAFENAMIDE FUMARATE SCH: 50; 200; 25 TABLET ORAL at 09:00

## 2020-01-27 RX ADMIN — Medication PRN ML: at 00:00

## 2020-01-28 VITALS — DIASTOLIC BLOOD PRESSURE: 71 MMHG | SYSTOLIC BLOOD PRESSURE: 144 MMHG

## 2020-01-28 VITALS — SYSTOLIC BLOOD PRESSURE: 162 MMHG | DIASTOLIC BLOOD PRESSURE: 72 MMHG

## 2020-01-28 VITALS — SYSTOLIC BLOOD PRESSURE: 181 MMHG | DIASTOLIC BLOOD PRESSURE: 73 MMHG

## 2020-01-28 VITALS — DIASTOLIC BLOOD PRESSURE: 79 MMHG | SYSTOLIC BLOOD PRESSURE: 157 MMHG

## 2020-01-28 VITALS — SYSTOLIC BLOOD PRESSURE: 148 MMHG | DIASTOLIC BLOOD PRESSURE: 81 MMHG

## 2020-01-28 VITALS — SYSTOLIC BLOOD PRESSURE: 153 MMHG | DIASTOLIC BLOOD PRESSURE: 68 MMHG

## 2020-01-28 RX ADMIN — Medication PRN ML: at 20:05

## 2020-01-28 RX ADMIN — Medication SCH MG: at 01:08

## 2020-01-28 RX ADMIN — BENZONATATE PRN MG: 100 CAPSULE ORAL at 16:26

## 2020-01-28 RX ADMIN — TAZOBACTAM SODIUM AND PIPERACILLIN SODIUM SCH MLS/HR: 375; 3 INJECTION, SOLUTION INTRAVENOUS at 23:35

## 2020-01-28 RX ADMIN — BRIMONIDINE TARTRATE SCH ML: 1.5 SOLUTION/ DROPS OPHTHALMIC at 21:41

## 2020-01-28 RX ADMIN — ATENOLOL SCH MG: 50 TABLET ORAL at 21:42

## 2020-01-28 RX ADMIN — GUAIFENESIN AND DEXTROMETHORPHAN HYDROBROMIDE PRN EACH: 600; 30 TABLET, EXTENDED RELEASE ORAL at 21:42

## 2020-01-28 RX ADMIN — METHYLPREDNISOLONE SODIUM SUCCINATE SCH MG: 40 INJECTION, POWDER, LYOPHILIZED, FOR SOLUTION INTRAMUSCULAR; INTRAVENOUS at 21:42

## 2020-01-28 RX ADMIN — BUDESONIDE SCH ML: 0.5 SUSPENSION RESPIRATORY (INHALATION) at 12:11

## 2020-01-28 RX ADMIN — THYROID, PORCINE SCH MG: 60 TABLET ORAL at 09:37

## 2020-01-28 RX ADMIN — BRIMONIDINE TARTRATE SCH ML: 1.5 SOLUTION/ DROPS OPHTHALMIC at 13:49

## 2020-01-28 RX ADMIN — TAZOBACTAM SODIUM AND PIPERACILLIN SODIUM SCH MLS/HR: 375; 3 INJECTION, SOLUTION INTRAVENOUS at 18:52

## 2020-01-28 RX ADMIN — Medication SCH TAB: at 09:37

## 2020-01-28 RX ADMIN — Medication PRN ML: at 16:46

## 2020-01-28 RX ADMIN — Medication PRN ML: at 12:11

## 2020-01-28 RX ADMIN — METHYLPREDNISOLONE SODIUM SUCCINATE SCH MG: 40 INJECTION, POWDER, LYOPHILIZED, FOR SOLUTION INTRAMUSCULAR; INTRAVENOUS at 06:46

## 2020-01-28 RX ADMIN — BUDESONIDE SCH ML: 0.5 SUSPENSION RESPIRATORY (INHALATION) at 20:05

## 2020-01-28 RX ADMIN — CLONIDINE HYDROCHLORIDE SCH MG: 0.1 TABLET ORAL at 09:37

## 2020-01-28 RX ADMIN — BRIMONIDINE TARTRATE SCH ML: 1.5 SOLUTION/ DROPS OPHTHALMIC at 09:37

## 2020-01-28 RX ADMIN — TAZOBACTAM SODIUM AND PIPERACILLIN SODIUM SCH MLS/HR: 375; 3 INJECTION, SOLUTION INTRAVENOUS at 13:48

## 2020-01-28 RX ADMIN — CLONIDINE HYDROCHLORIDE PRN MG: 0.1 TABLET ORAL at 01:04

## 2020-01-28 RX ADMIN — BRINZOLAMIDE SCH ML: 10 SUSPENSION/ DROPS OPHTHALMIC at 13:49

## 2020-01-28 RX ADMIN — TAZOBACTAM SODIUM AND PIPERACILLIN SODIUM SCH MLS/HR: 375; 3 INJECTION, SOLUTION INTRAVENOUS at 01:08

## 2020-01-28 RX ADMIN — GUAIFENESIN AND DEXTROMETHORPHAN HYDROBROMIDE PRN EACH: 600; 30 TABLET, EXTENDED RELEASE ORAL at 09:36

## 2020-01-28 RX ADMIN — Medication SCH MG: at 09:37

## 2020-01-28 RX ADMIN — BICTEGRAVIR SODIUM, EMTRICITABINE, AND TENOFOVIR ALAFENAMIDE FUMARATE SCH: 50; 200; 25 TABLET ORAL at 09:00

## 2020-01-28 RX ADMIN — BRINZOLAMIDE SCH ML: 10 SUSPENSION/ DROPS OPHTHALMIC at 09:37

## 2020-01-28 RX ADMIN — BRINZOLAMIDE SCH ML: 10 SUSPENSION/ DROPS OPHTHALMIC at 21:41

## 2020-01-28 RX ADMIN — TAZOBACTAM SODIUM AND PIPERACILLIN SODIUM SCH MLS/HR: 375; 3 INJECTION, SOLUTION INTRAVENOUS at 06:05

## 2020-01-28 RX ADMIN — METHYLPREDNISOLONE SODIUM SUCCINATE SCH MG: 40 INJECTION, POWDER, LYOPHILIZED, FOR SOLUTION INTRAMUSCULAR; INTRAVENOUS at 13:52

## 2020-01-28 NOTE — CONSULTATION
DATE OF CONSULTATION:  01/28/2020

 

Pulmonary Consultation 

 

REASON FOR CONSULT:  Asthma exacerbation.

 

CHIEF COMPLAINT:  Shortness of breath, wheezing.

 

HISTORY OF PRESENT ILLNESS:  Ms. Edwards is a 79-year-old female.  She has a known history

of asthma.  She reported that asthma started in 1989 and she underwent a sinus surgery

for that, however, since then she has been stable.  She uses nebulizer treatment at

home.  She is not on any maintenance steroid inhaler to use Advair in the past, but it

causes palpitations, so she stopped it.  She reports that the shortness of breath and

wheezing started few days ago, progressively getting worse, was not relieved by

nebulizer treatment.  It was constant, associated with anxiety and chest discomfort, so

she decided to come to the emergency room.  She is attributing this to the infection she

got from her son. 

 

REVIEW OF SYSTEMS:

GENERAL:  Denies any fever or chills. 

HEAD:  Denies any head trauma. 

ENT:  Denies any earaches. 

CVS:  Denies any chest pain. 

RESPIRATORY:  Shortness of breath. 

GI:  Denies any nausea or vomiting. 

 

The rest of the review of systems are negative except as in HPI.

 

PAST MEDICAL HISTORY:  Asthma, history of seizure, which has resolved, sinus surgery,

hypertension, and anxiety. 

 

PAST SURGICAL HISTORY:  Sinus surgery in the past in 80s, one sinus surgery was

complicated by seizures, as the probe touched the brain. 

 

FAMILY AND SOCIAL HISTORY:  She has never smoked.  She lives in a house, where she has

inside dogs, outside cat.  She has goats as well. 

 

PHYSICAL EXAMINATION:

VITAL SIGNS:  Temperature 96.4, pulse of 57, and blood pressure 153/68. 

CHEST:  Wheezing bilaterally and reduced air entry. 

HEART:  S1 and S2 audible. 

GENERAL APPEARANCE:  She is an elderly female, not in any obvious distress. 

ABDOMEN:  Soft and nontender. 

EXTREMITIES:  No pedal edema. 

NEUROLOGIC:  Awake and alert.  No focal neurologic deficit.

LABORATORY DATA:  Reviewed.  White count of 5000 and hemoglobin 12.3.  Chemistries

within normal limits.  Chest x-ray, I reviewed the film, possibility of right lower lobe

atelectasis versus pneumonia. 

 

ASSESSMENT/PLAN:  Ms. Edwards is a 79-year-old female came in with asthma exacerbation,

likely triggered from lower lobe pneumonia. 

 

PLAN:  Continue the patient on Solu-Medrol as ordered.  Nebulizer treatment as ordered.

Oxygen as needed to keep the O2 saturation more than or equal to 92%.  Continue IV

antibiotics for possibility of pneumonia.  I will add azithromycin for atypical coverage

and Pulmicort.  Discussed with  at bedside. 

 

Thank you for this consult.

 

 

 

 

______________________________

MD CHRISTAL Abreu/ANDRIA

D:  01/28/2020 10:50:31

T:  01/28/2020 18:36:42

Job #:  [id number]

## 2020-01-29 VITALS — DIASTOLIC BLOOD PRESSURE: 81 MMHG | SYSTOLIC BLOOD PRESSURE: 197 MMHG

## 2020-01-29 VITALS — SYSTOLIC BLOOD PRESSURE: 166 MMHG | DIASTOLIC BLOOD PRESSURE: 67 MMHG

## 2020-01-29 VITALS — SYSTOLIC BLOOD PRESSURE: 132 MMHG | DIASTOLIC BLOOD PRESSURE: 60 MMHG

## 2020-01-29 VITALS — DIASTOLIC BLOOD PRESSURE: 72 MMHG | SYSTOLIC BLOOD PRESSURE: 161 MMHG

## 2020-01-29 VITALS — DIASTOLIC BLOOD PRESSURE: 86 MMHG | SYSTOLIC BLOOD PRESSURE: 199 MMHG

## 2020-01-29 VITALS — DIASTOLIC BLOOD PRESSURE: 72 MMHG | SYSTOLIC BLOOD PRESSURE: 166 MMHG

## 2020-01-29 VITALS — SYSTOLIC BLOOD PRESSURE: 128 MMHG | DIASTOLIC BLOOD PRESSURE: 77 MMHG

## 2020-01-29 VITALS — DIASTOLIC BLOOD PRESSURE: 63 MMHG | SYSTOLIC BLOOD PRESSURE: 144 MMHG

## 2020-01-29 VITALS — DIASTOLIC BLOOD PRESSURE: 67 MMHG | SYSTOLIC BLOOD PRESSURE: 166 MMHG

## 2020-01-29 PROCEDURE — 02HV33Z INSERTION OF INFUSION DEVICE INTO SUPERIOR VENA CAVA, PERCUTANEOUS APPROACH: ICD-10-PCS | Performed by: INTERNAL MEDICINE

## 2020-01-29 PROCEDURE — B548ZZA ULTRASONOGRAPHY OF SUPERIOR VENA CAVA, GUIDANCE: ICD-10-PCS | Performed by: INTERNAL MEDICINE

## 2020-01-29 RX ADMIN — BUDESONIDE SCH ML: 0.5 SUSPENSION RESPIRATORY (INHALATION) at 06:50

## 2020-01-29 RX ADMIN — GUAIFENESIN AND DEXTROMETHORPHAN HYDROBROMIDE PRN EACH: 600; 30 TABLET, EXTENDED RELEASE ORAL at 09:31

## 2020-01-29 RX ADMIN — BUDESONIDE SCH ML: 0.5 SUSPENSION RESPIRATORY (INHALATION) at 20:20

## 2020-01-29 RX ADMIN — METHYLPREDNISOLONE SODIUM SUCCINATE SCH MG: 40 INJECTION, POWDER, LYOPHILIZED, FOR SOLUTION INTRAMUSCULAR; INTRAVENOUS at 18:00

## 2020-01-29 RX ADMIN — Medication PRN ML: at 15:05

## 2020-01-29 RX ADMIN — BICTEGRAVIR SODIUM, EMTRICITABINE, AND TENOFOVIR ALAFENAMIDE FUMARATE SCH: 50; 200; 25 TABLET ORAL at 09:00

## 2020-01-29 RX ADMIN — METHYLPREDNISOLONE SODIUM SUCCINATE SCH MG: 40 INJECTION, POWDER, LYOPHILIZED, FOR SOLUTION INTRAMUSCULAR; INTRAVENOUS at 05:31

## 2020-01-29 RX ADMIN — TAZOBACTAM SODIUM AND PIPERACILLIN SODIUM SCH MLS/HR: 375; 3 INJECTION, SOLUTION INTRAVENOUS at 05:31

## 2020-01-29 RX ADMIN — TAZOBACTAM SODIUM AND PIPERACILLIN SODIUM SCH MLS/HR: 375; 3 INJECTION, SOLUTION INTRAVENOUS at 18:00

## 2020-01-29 RX ADMIN — ATENOLOL SCH MG: 50 TABLET ORAL at 21:56

## 2020-01-29 RX ADMIN — CLONIDINE HYDROCHLORIDE SCH MG: 0.1 TABLET ORAL at 09:24

## 2020-01-29 RX ADMIN — Medication SCH MG: at 00:52

## 2020-01-29 RX ADMIN — Medication PRN ML: at 06:50

## 2020-01-29 RX ADMIN — BRIMONIDINE TARTRATE SCH ML: 1.5 SOLUTION/ DROPS OPHTHALMIC at 09:24

## 2020-01-29 RX ADMIN — TAZOBACTAM SODIUM AND PIPERACILLIN SODIUM SCH MLS/HR: 375; 3 INJECTION, SOLUTION INTRAVENOUS at 12:33

## 2020-01-29 RX ADMIN — Medication PRN ML: at 03:50

## 2020-01-29 RX ADMIN — Medication PRN ML: at 20:20

## 2020-01-29 RX ADMIN — Medication PRN ML: at 00:10

## 2020-01-29 RX ADMIN — BRINZOLAMIDE SCH ML: 10 SUSPENSION/ DROPS OPHTHALMIC at 15:07

## 2020-01-29 RX ADMIN — Medication SCH MG: at 09:24

## 2020-01-29 RX ADMIN — GUAIFENESIN AND DEXTROMETHORPHAN HYDROBROMIDE PRN EACH: 600; 30 TABLET, EXTENDED RELEASE ORAL at 21:56

## 2020-01-29 RX ADMIN — Medication SCH TAB: at 09:24

## 2020-01-29 RX ADMIN — BRIMONIDINE TARTRATE SCH ML: 1.5 SOLUTION/ DROPS OPHTHALMIC at 21:54

## 2020-01-29 RX ADMIN — THYROID, PORCINE SCH MG: 60 TABLET ORAL at 09:24

## 2020-01-29 RX ADMIN — Medication PRN ML: at 11:20

## 2020-01-29 RX ADMIN — BRINZOLAMIDE SCH ML: 10 SUSPENSION/ DROPS OPHTHALMIC at 21:54

## 2020-01-29 RX ADMIN — BRINZOLAMIDE SCH ML: 10 SUSPENSION/ DROPS OPHTHALMIC at 09:24

## 2020-01-29 RX ADMIN — TAZOBACTAM SODIUM AND PIPERACILLIN SODIUM SCH MLS/HR: 375; 3 INJECTION, SOLUTION INTRAVENOUS at 12:00

## 2020-01-29 RX ADMIN — BRIMONIDINE TARTRATE SCH ML: 1.5 SOLUTION/ DROPS OPHTHALMIC at 15:07

## 2020-01-29 RX ADMIN — BENZONATATE PRN MG: 100 CAPSULE ORAL at 17:37

## 2020-01-29 RX ADMIN — Medication PRN ML: at 23:55

## 2020-01-29 NOTE — DIAGNOSTIC IMAGING REPORT
EXAMINATION:  CHEST X-RAY LINE PLACEMENT   



COMPARISON:  Chest x-ray 1/26/2020



INDICATION: 

^CONFIRM PICC PLACEMENT  



DISCUSSION:

Frontal view of the chest obtained at 2237 hours.



HEART AND MEDIASTINUM:  The cardiomediastinal silhouette is stable with a

prominent pericardial fat pad.

  

LINES:  Right PICC line terminates in the SVC without pneumothorax.



LUNGS:  Diffuse hyperinflation suggestive of small airways disease. Mild

bilateral apical pleural-parenchymal thickening. No pneumonia or pulmonary

edema.



PLEURA:  No pleural effusion or pneumothorax.



BONES AND SOFT TISSUES:  No focal osseous lesion. The soft tissues are normal.





IMPRESSION: 

Right PICC line terminates in the SVC without pneumothorax. No new

cardiopulmonary process.



Signed by: Dr. Jason Mckinnon MD on 1/29/2020 10:53 PM

## 2020-01-30 VITALS — SYSTOLIC BLOOD PRESSURE: 143 MMHG | DIASTOLIC BLOOD PRESSURE: 63 MMHG

## 2020-01-30 VITALS — DIASTOLIC BLOOD PRESSURE: 63 MMHG | SYSTOLIC BLOOD PRESSURE: 143 MMHG

## 2020-01-30 VITALS — DIASTOLIC BLOOD PRESSURE: 69 MMHG | SYSTOLIC BLOOD PRESSURE: 149 MMHG

## 2020-01-30 VITALS — SYSTOLIC BLOOD PRESSURE: 157 MMHG | DIASTOLIC BLOOD PRESSURE: 72 MMHG

## 2020-01-30 VITALS — SYSTOLIC BLOOD PRESSURE: 149 MMHG | DIASTOLIC BLOOD PRESSURE: 69 MMHG

## 2020-01-30 VITALS — DIASTOLIC BLOOD PRESSURE: 67 MMHG | SYSTOLIC BLOOD PRESSURE: 148 MMHG

## 2020-01-30 VITALS — SYSTOLIC BLOOD PRESSURE: 191 MMHG | DIASTOLIC BLOOD PRESSURE: 88 MMHG

## 2020-01-30 RX ADMIN — BUDESONIDE SCH ML: 0.5 SUSPENSION RESPIRATORY (INHALATION) at 20:35

## 2020-01-30 RX ADMIN — BRIMONIDINE TARTRATE SCH ML: 1.5 SOLUTION/ DROPS OPHTHALMIC at 20:54

## 2020-01-30 RX ADMIN — Medication SCH TAB: at 09:32

## 2020-01-30 RX ADMIN — BRIMONIDINE TARTRATE SCH ML: 1.5 SOLUTION/ DROPS OPHTHALMIC at 09:29

## 2020-01-30 RX ADMIN — Medication SCH MG: at 01:07

## 2020-01-30 RX ADMIN — TAZOBACTAM SODIUM AND PIPERACILLIN SODIUM SCH MLS/HR: 375; 3 INJECTION, SOLUTION INTRAVENOUS at 11:58

## 2020-01-30 RX ADMIN — Medication SCH MG: at 09:32

## 2020-01-30 RX ADMIN — GUAIFENESIN AND DEXTROMETHORPHAN HYDROBROMIDE PRN EACH: 600; 30 TABLET, EXTENDED RELEASE ORAL at 21:30

## 2020-01-30 RX ADMIN — Medication PRN ML: at 15:25

## 2020-01-30 RX ADMIN — METHYLPREDNISOLONE SODIUM SUCCINATE SCH MG: 40 INJECTION, POWDER, LYOPHILIZED, FOR SOLUTION INTRAMUSCULAR; INTRAVENOUS at 01:07

## 2020-01-30 RX ADMIN — Medication PRN ML: at 20:35

## 2020-01-30 RX ADMIN — BUDESONIDE SCH ML: 0.5 SUSPENSION RESPIRATORY (INHALATION) at 07:18

## 2020-01-30 RX ADMIN — BRINZOLAMIDE SCH ML: 10 SUSPENSION/ DROPS OPHTHALMIC at 20:54

## 2020-01-30 RX ADMIN — BRIMONIDINE TARTRATE SCH ML: 1.5 SOLUTION/ DROPS OPHTHALMIC at 15:20

## 2020-01-30 RX ADMIN — ATENOLOL SCH MG: 50 TABLET ORAL at 20:54

## 2020-01-30 RX ADMIN — Medication PRN ML: at 11:20

## 2020-01-30 RX ADMIN — CLONIDINE HYDROCHLORIDE SCH MG: 0.1 TABLET ORAL at 09:32

## 2020-01-30 RX ADMIN — CLONIDINE HYDROCHLORIDE PRN MG: 0.1 TABLET ORAL at 04:48

## 2020-01-30 RX ADMIN — TAZOBACTAM SODIUM AND PIPERACILLIN SODIUM SCH MLS/HR: 375; 3 INJECTION, SOLUTION INTRAVENOUS at 06:27

## 2020-01-30 RX ADMIN — BICTEGRAVIR SODIUM, EMTRICITABINE, AND TENOFOVIR ALAFENAMIDE FUMARATE SCH: 50; 200; 25 TABLET ORAL at 09:00

## 2020-01-30 RX ADMIN — METHYLPREDNISOLONE SODIUM SUCCINATE SCH MG: 40 INJECTION, POWDER, LYOPHILIZED, FOR SOLUTION INTRAMUSCULAR; INTRAVENOUS at 09:32

## 2020-01-30 RX ADMIN — BRINZOLAMIDE SCH ML: 10 SUSPENSION/ DROPS OPHTHALMIC at 15:20

## 2020-01-30 RX ADMIN — BRINZOLAMIDE SCH ML: 10 SUSPENSION/ DROPS OPHTHALMIC at 09:29

## 2020-01-30 RX ADMIN — TAZOBACTAM SODIUM AND PIPERACILLIN SODIUM SCH MLS/HR: 375; 3 INJECTION, SOLUTION INTRAVENOUS at 17:56

## 2020-01-30 RX ADMIN — TAZOBACTAM SODIUM AND PIPERACILLIN SODIUM SCH MLS/HR: 375; 3 INJECTION, SOLUTION INTRAVENOUS at 00:00

## 2020-01-30 RX ADMIN — Medication PRN ML: at 03:20

## 2020-01-30 RX ADMIN — Medication PRN ML: at 07:18

## 2020-01-30 RX ADMIN — THYROID, PORCINE SCH MG: 60 TABLET ORAL at 09:32

## 2020-01-30 RX ADMIN — METHYLPREDNISOLONE SODIUM SUCCINATE SCH MG: 40 INJECTION, POWDER, LYOPHILIZED, FOR SOLUTION INTRAMUSCULAR; INTRAVENOUS at 17:56

## 2020-01-31 VITALS — SYSTOLIC BLOOD PRESSURE: 190 MMHG | DIASTOLIC BLOOD PRESSURE: 79 MMHG

## 2020-01-31 VITALS — DIASTOLIC BLOOD PRESSURE: 59 MMHG | SYSTOLIC BLOOD PRESSURE: 125 MMHG

## 2020-01-31 VITALS — SYSTOLIC BLOOD PRESSURE: 184 MMHG | DIASTOLIC BLOOD PRESSURE: 90 MMHG

## 2020-01-31 VITALS — DIASTOLIC BLOOD PRESSURE: 70 MMHG | SYSTOLIC BLOOD PRESSURE: 149 MMHG

## 2020-01-31 VITALS — DIASTOLIC BLOOD PRESSURE: 79 MMHG | SYSTOLIC BLOOD PRESSURE: 190 MMHG

## 2020-01-31 VITALS — DIASTOLIC BLOOD PRESSURE: 64 MMHG | SYSTOLIC BLOOD PRESSURE: 139 MMHG

## 2020-01-31 RX ADMIN — Medication PRN ML: at 23:20

## 2020-01-31 RX ADMIN — Medication PRN ML: at 07:53

## 2020-01-31 RX ADMIN — BUDESONIDE SCH ML: 0.5 SUSPENSION RESPIRATORY (INHALATION) at 19:55

## 2020-01-31 RX ADMIN — TAZOBACTAM SODIUM AND PIPERACILLIN SODIUM SCH MLS/HR: 375; 3 INJECTION, SOLUTION INTRAVENOUS at 18:30

## 2020-01-31 RX ADMIN — Medication SCH TAB: at 08:08

## 2020-01-31 RX ADMIN — CLONIDINE HYDROCHLORIDE SCH MG: 0.1 TABLET ORAL at 11:35

## 2020-01-31 RX ADMIN — Medication SCH MG: at 00:03

## 2020-01-31 RX ADMIN — BUDESONIDE SCH ML: 0.5 SUSPENSION RESPIRATORY (INHALATION) at 07:53

## 2020-01-31 RX ADMIN — TAZOBACTAM SODIUM AND PIPERACILLIN SODIUM SCH MLS/HR: 375; 3 INJECTION, SOLUTION INTRAVENOUS at 00:03

## 2020-01-31 RX ADMIN — TAZOBACTAM SODIUM AND PIPERACILLIN SODIUM SCH MLS/HR: 375; 3 INJECTION, SOLUTION INTRAVENOUS at 23:43

## 2020-01-31 RX ADMIN — THYROID, PORCINE SCH MG: 60 TABLET ORAL at 08:09

## 2020-01-31 RX ADMIN — Medication PRN ML: at 00:20

## 2020-01-31 RX ADMIN — Medication SCH MG: at 23:40

## 2020-01-31 RX ADMIN — BRIMONIDINE TARTRATE SCH ML: 1.5 SOLUTION/ DROPS OPHTHALMIC at 20:28

## 2020-01-31 RX ADMIN — BENZONATATE PRN MG: 100 CAPSULE ORAL at 11:11

## 2020-01-31 RX ADMIN — METHYLPREDNISOLONE SODIUM SUCCINATE SCH MG: 40 INJECTION, POWDER, LYOPHILIZED, FOR SOLUTION INTRAMUSCULAR; INTRAVENOUS at 15:22

## 2020-01-31 RX ADMIN — BRINZOLAMIDE SCH ML: 10 SUSPENSION/ DROPS OPHTHALMIC at 15:21

## 2020-01-31 RX ADMIN — TAZOBACTAM SODIUM AND PIPERACILLIN SODIUM SCH MLS/HR: 375; 3 INJECTION, SOLUTION INTRAVENOUS at 13:19

## 2020-01-31 RX ADMIN — BRINZOLAMIDE SCH ML: 10 SUSPENSION/ DROPS OPHTHALMIC at 08:08

## 2020-01-31 RX ADMIN — BRIMONIDINE TARTRATE SCH ML: 1.5 SOLUTION/ DROPS OPHTHALMIC at 08:08

## 2020-01-31 RX ADMIN — BICTEGRAVIR SODIUM, EMTRICITABINE, AND TENOFOVIR ALAFENAMIDE FUMARATE SCH: 50; 200; 25 TABLET ORAL at 08:08

## 2020-01-31 RX ADMIN — Medication PRN ML: at 03:30

## 2020-01-31 RX ADMIN — TAZOBACTAM SODIUM AND PIPERACILLIN SODIUM SCH MLS/HR: 375; 3 INJECTION, SOLUTION INTRAVENOUS at 06:06

## 2020-01-31 RX ADMIN — Medication PRN ML: at 11:35

## 2020-01-31 RX ADMIN — CLONIDINE HYDROCHLORIDE PRN MG: 0.1 TABLET ORAL at 06:22

## 2020-01-31 RX ADMIN — BUDESONIDE SCH ML: 0.5 SUSPENSION RESPIRATORY (INHALATION) at 08:34

## 2020-01-31 RX ADMIN — Medication SCH MG: at 08:08

## 2020-01-31 RX ADMIN — GUAIFENESIN AND DEXTROMETHORPHAN HYDROBROMIDE PRN EACH: 600; 30 TABLET, EXTENDED RELEASE ORAL at 11:11

## 2020-01-31 RX ADMIN — CLONIDINE HYDROCHLORIDE SCH MG: 0.1 TABLET ORAL at 08:08

## 2020-01-31 RX ADMIN — BRIMONIDINE TARTRATE SCH ML: 1.5 SOLUTION/ DROPS OPHTHALMIC at 15:21

## 2020-01-31 RX ADMIN — BRINZOLAMIDE SCH ML: 10 SUSPENSION/ DROPS OPHTHALMIC at 20:28

## 2020-01-31 RX ADMIN — Medication PRN ML: at 15:37

## 2020-01-31 RX ADMIN — ATENOLOL SCH MG: 50 TABLET ORAL at 23:40

## 2020-01-31 RX ADMIN — METHYLPREDNISOLONE SODIUM SUCCINATE SCH MG: 40 INJECTION, POWDER, LYOPHILIZED, FOR SOLUTION INTRAMUSCULAR; INTRAVENOUS at 08:08

## 2020-01-31 RX ADMIN — Medication PRN ML: at 19:55

## 2020-02-01 VITALS — DIASTOLIC BLOOD PRESSURE: 65 MMHG | SYSTOLIC BLOOD PRESSURE: 141 MMHG

## 2020-02-01 VITALS — SYSTOLIC BLOOD PRESSURE: 141 MMHG | DIASTOLIC BLOOD PRESSURE: 65 MMHG

## 2020-02-01 VITALS — SYSTOLIC BLOOD PRESSURE: 189 MMHG | DIASTOLIC BLOOD PRESSURE: 79 MMHG

## 2020-02-01 VITALS — DIASTOLIC BLOOD PRESSURE: 82 MMHG | SYSTOLIC BLOOD PRESSURE: 176 MMHG

## 2020-02-01 RX ADMIN — BRIMONIDINE TARTRATE SCH ML: 1.5 SOLUTION/ DROPS OPHTHALMIC at 09:11

## 2020-02-01 RX ADMIN — Medication SCH TAB: at 09:11

## 2020-02-01 RX ADMIN — Medication SCH MG: at 09:11

## 2020-02-01 RX ADMIN — BICTEGRAVIR SODIUM, EMTRICITABINE, AND TENOFOVIR ALAFENAMIDE FUMARATE SCH: 50; 200; 25 TABLET ORAL at 09:00

## 2020-02-01 RX ADMIN — CLONIDINE HYDROCHLORIDE PRN MG: 0.1 TABLET ORAL at 04:47

## 2020-02-01 RX ADMIN — CLONIDINE HYDROCHLORIDE SCH MG: 0.1 TABLET ORAL at 09:11

## 2020-02-01 RX ADMIN — TAZOBACTAM SODIUM AND PIPERACILLIN SODIUM SCH MLS/HR: 375; 3 INJECTION, SOLUTION INTRAVENOUS at 05:43

## 2020-02-01 RX ADMIN — Medication PRN ML: at 07:00

## 2020-02-01 RX ADMIN — BRINZOLAMIDE SCH ML: 10 SUSPENSION/ DROPS OPHTHALMIC at 09:11

## 2020-02-01 RX ADMIN — BUDESONIDE SCH ML: 0.5 SUSPENSION RESPIRATORY (INHALATION) at 07:00

## 2020-02-01 RX ADMIN — METHYLPREDNISOLONE SODIUM SUCCINATE SCH MG: 40 INJECTION, POWDER, LYOPHILIZED, FOR SOLUTION INTRAMUSCULAR; INTRAVENOUS at 09:00

## 2020-02-01 RX ADMIN — THYROID, PORCINE SCH MG: 60 TABLET ORAL at 09:11

## 2020-02-01 NOTE — DISCHARGE SUMMARY
DISCHARGE DIAGNOSES:  

1. Acute exacerbation of chronic obstructive pulmonary disease.

2. Pneumonia.

3. Hypertension.

 

DISCHARGE MEDICATIONS:  See discharge med list.

 

DIET:  Low salt.

 

ACTIVITIES:  As tolerated.  Follow up in two weeks with me.

 

HISTORY OF PRESENT ILLNESS AND HOSPITAL COURSE:  See hospital chart for full details.

The patient is a lady with a history of asthma, who presented with shortness of breath,

cough, fever, where she was noticed to have some evidence of pneumonia, acute

exacerbation of her COPD, so she was brought in, placed on IV antibiotics, O2, steroids,

nebulizer treatments.  She was seen by Pulmonary.  She had an echo done that showed a

normal ejection fraction.  The patient did make daily improvement each day.  At the time

of discharge, she was back to her baseline.  Her lungs exam clear.  She is able to

ambulate well, so she was discharged on a prednisone taper as well as Ceftin 250 mg

b.i.d. for 10 more days.  She will follow up in two weeks with me. 

 

Please see hospital chart for full details.

 

 

 

 

______________________________

MD PABLO Locke/[place]

D:  02/01/2020 05:36:52

T:  02/01/2020 06:08:19

Job #:  [id number]

## 2020-03-03 NOTE — OPERATIVE REPORT
DATE OF PROCEDURE:  01/24/2020

 

SURGEON:  Tim Vargas MD

 

PREOPERATIVE DIAGNOSES:  

1. Interstitial cystitis. 

2. Microhematuria.

3. Urinary tract infections.

 

POSTOPERATIVE DIAGNOSES:  

1. Interstitial cystitis. 

2. Microhematuria.

3. Urinary tract infections.

4. Grade 1-2 cystocele.

5. Grade 1 rectocele.

6. Atrophic (senile) vaginitis.

7. Urethral stenosis.

 

OPERATIONS PERFORMED:  

1. Cystourethroscopy with calibration and dilation of urethral stenosis (separate

procedure performed for the urethral stenosis). 

2. Cystourethroscopy with bilateral ureteral catheterization and retrograde

ureteropyelography (separate procedure performed for hematuria and urinary tract

infections). 

3. Interpretation of retrograde ureteropyelography.

4. Cystourethroscopy with hydrodistention (separate procedure performed for interstitial

cystitis). 

5. Pelvic examination under anesthesia.

 

ANESTHESIA:  General.

 

COMPLICATIONS:  None.

 

CLINICAL SUMMARY:  Lauren Edwards is a 79-year-old woman with the above preoperative

diagnoses. She is brought for the above procedure. She is aware of the risks of

bleeding, infection, injury to adjacent structures, need for additional procedures and

elected to proceed. 

 

OPERATIVE PROCEDURE IN DETAIL:  Informed consent was verified Lauren Edwards was

properly identified, taken to the operating room, placed on the cystoscopy table in

supine position.  Anesthesia was uneventfully begun.  The 22.5-[place]izen [place] cystoscope sheath

with the obturator in place was atraumatically inserted into the patient's urethra.

There was some significant resistance with calibrating the urethra at probably 21-Citizen [place]

in size.  Panendoscopy of the bladder was performed.  There was a vesicle like area at

the 8 o'clock position just inside the bladder neck.  This appeared to be an

inflammatory vesicle that was not suspicious and biopsy was not performed at this time.

There were no suspicious lesions.  There were no tumors.  Normally positioned configured

ureteral orifices were identified. 

 

An 8-Citizen [place] catheter was used to cannulate each ureter and retrograde ureteropyelogram

was performed. 

 

Interpretation of retrograde ureteropyelography contrast was instilled in retrograde

fashion bilaterally.  There were no tumors, no stones, no diverticula.  Unobstructed

drainage was observed bilaterally fluoroscopically. 

 

Hydrodistention was then carried out to exactly 80 cm of water height and held in place

for exactly 2 minutes by the clock.  This revealed a bladder capacity under anesthesia

of only 700 mL.  Panendoscopy following hydrodistention revealed glomerulations, but

there were no tumors, there were no suspicious lesions and no Hunner's ulcers. 

 

The cystoscope was withdrawn.  Dilation of the urethra was then carried out the

30-Citizen [place] in size. 

 

Pelvic examination under anesthesia revealed a grade 1-2 cystocele, grade 1 rectocele

there was atrophic (senile) vaginitis.  No abnormal palpable pelvic masses could be

appreciated.  There were no obvious mucosal lesions. The patient was then uneventfully

reversed from anesthesia and taken to recovery room in stable condition.  There were no

complications of the procedure.  She tolerated the procedure well.  Explicit postop

instructions were given. We will follow the patient up in the office. 

 

 

 

 

______________________________

Tim Vargas MD

 

OH/MODL

D:  03/03/2020 20:31:38

T:  03/03/2020 23:06:17

Job #:  [id number]

 

cc:            MD Earl Vega MD
Statement Selected

## 2022-06-18 ENCOUNTER — HOSPITAL ENCOUNTER (EMERGENCY)
Dept: HOSPITAL 88 - ER | Age: 82
Discharge: HOME | End: 2022-06-18
Payer: MEDICARE

## 2022-06-18 VITALS — HEIGHT: 60 IN | WEIGHT: 160 LBS | BODY MASS INDEX: 31.41 KG/M2

## 2022-06-18 DIAGNOSIS — Z85.828: ICD-10-CM

## 2022-06-18 DIAGNOSIS — Z88.6: ICD-10-CM

## 2022-06-18 DIAGNOSIS — Z88.1: ICD-10-CM
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
## 2022-06-18 DIAGNOSIS — Z88.2: ICD-10-CM

## 2022-06-18 DIAGNOSIS — I10: ICD-10-CM

## 2022-06-18 DIAGNOSIS — J45.909: ICD-10-CM

## 2022-06-18 DIAGNOSIS — Z79.899: ICD-10-CM

## 2022-06-18 DIAGNOSIS — K21.9: ICD-10-CM

## 2022-06-18 DIAGNOSIS — G40.909: ICD-10-CM

## 2022-06-18 DIAGNOSIS — S22.009A: Primary | ICD-10-CM

## 2022-06-18 DIAGNOSIS — Z88.8: ICD-10-CM

## 2022-06-18 PROCEDURE — 72128 CT CHEST SPINE W/O DYE: CPT

## 2022-06-18 PROCEDURE — 99283 EMERGENCY DEPT VISIT LOW MDM: CPT

## 2022-07-09 ENCOUNTER — HOSPITAL ENCOUNTER (EMERGENCY)
Dept: HOSPITAL 88 - ER | Age: 82
Discharge: HOME | End: 2022-07-09
Payer: MEDICARE

## 2022-07-09 VITALS — DIASTOLIC BLOOD PRESSURE: 55 MMHG | SYSTOLIC BLOOD PRESSURE: 135 MMHG

## 2022-07-09 VITALS — WEIGHT: 160 LBS | BODY MASS INDEX: 31.41 KG/M2 | HEIGHT: 60 IN

## 2022-07-09 DIAGNOSIS — Z85.828: ICD-10-CM

## 2022-07-09 DIAGNOSIS — J45.909: ICD-10-CM

## 2022-07-09 DIAGNOSIS — G89.29: ICD-10-CM

## 2022-07-09 DIAGNOSIS — M48.04: ICD-10-CM

## 2022-07-09 DIAGNOSIS — M48.54XG: Primary | ICD-10-CM

## 2022-07-09 DIAGNOSIS — I10: ICD-10-CM

## 2022-07-09 DIAGNOSIS — M54.50: ICD-10-CM

## 2022-07-09 DIAGNOSIS — M51.36: ICD-10-CM

## 2022-07-09 DIAGNOSIS — K21.9: ICD-10-CM

## 2022-07-09 PROCEDURE — 99283 EMERGENCY DEPT VISIT LOW MDM: CPT

## 2022-07-09 PROCEDURE — 72131 CT LUMBAR SPINE W/O DYE: CPT

## 2022-07-09 PROCEDURE — 72128 CT CHEST SPINE W/O DYE: CPT

## 2022-09-09 ENCOUNTER — HOSPITAL ENCOUNTER (OUTPATIENT)
Dept: HOSPITAL 88 - PT | Age: 82
LOS: 21 days | End: 2022-09-30
Attending: INTERNAL MEDICINE
Payer: MEDICARE

## 2022-09-09 DIAGNOSIS — M54.50: Primary | ICD-10-CM

## 2023-03-03 ENCOUNTER — HOSPITAL ENCOUNTER (EMERGENCY)
Dept: HOSPITAL 88 - ER | Age: 83
Discharge: HOME | End: 2023-03-03
Payer: MEDICARE

## 2023-03-03 VITALS — WEIGHT: 160 LBS | HEIGHT: 60 IN | BODY MASS INDEX: 31.41 KG/M2

## 2023-03-03 DIAGNOSIS — R19.7: ICD-10-CM

## 2023-03-03 DIAGNOSIS — I16.0: Primary | ICD-10-CM

## 2023-03-03 DIAGNOSIS — K21.9: ICD-10-CM

## 2023-03-03 DIAGNOSIS — Z96.652: ICD-10-CM

## 2023-03-03 DIAGNOSIS — J45.909: ICD-10-CM

## 2023-03-03 DIAGNOSIS — G40.909: ICD-10-CM

## 2023-03-03 DIAGNOSIS — I10: ICD-10-CM

## 2023-03-03 DIAGNOSIS — Z85.828: ICD-10-CM

## 2023-03-03 LAB
ALBUMIN SERPL-MCNC: 3.8 G/DL (ref 3.5–5)
ALBUMIN/GLOB SERPL: 1.3 {RATIO} (ref 0.8–2)
ALP SERPL-CCNC: 112 IU/L (ref 40–150)
ALT SERPL-CCNC: 13 IU/L (ref 0–55)
ANION GAP SERPL CALC-SCNC: 12.5 MMOL/L (ref 8–16)
BACTERIA URNS QL MICRO: (no result) /HPF
BASOPHILS # BLD AUTO: 0 10*3/UL (ref 0–0.1)
BASOPHILS NFR BLD AUTO: 0.5 % (ref 0–1)
BUN SERPL-MCNC: 17 MG/DL (ref 7–26)
BUN/CREAT SERPL: 20 (ref 6–25)
CALCIUM SERPL-MCNC: 10.5 MG/DL (ref 8.4–10.2)
CHLORIDE SERPL-SCNC: 108 MMOL/L (ref 98–107)
CLARITY UR: (no result)
CO2 SERPL-SCNC: 27 MMOL/L (ref 22–29)
COLOR UR: YELLOW
DEPRECATED NEUTROPHILS # BLD AUTO: 5.6 10*3/UL (ref 2.1–6.9)
DEPRECATED RBC URNS MANUAL-ACNC: (no result) /HPF (ref 0–5)
EOSINOPHIL # BLD AUTO: 0.1 10*3/UL (ref 0–0.4)
EOSINOPHIL NFR BLD AUTO: 0.6 % (ref 0–6)
EPI CELLS URNS QL MICRO: (no result) /LPF
ERYTHROCYTE [DISTWIDTH] IN CORD BLOOD: 12.6 % (ref 11.7–14.4)
GLOBULIN PLAS-MCNC: 3 G/DL (ref 2.3–3.5)
GLUCOSE SERPLBLD-MCNC: 108 MG/DL (ref 74–118)
HCT VFR BLD AUTO: 47.7 % (ref 34.2–44.1)
HGB BLD-MCNC: 15.2 G/DL (ref 12–16)
KETONES UR QL STRIP.AUTO: NEGATIVE
LEUKOCYTE ESTERASE UR QL STRIP.AUTO: (no result)
LYMPHOCYTES # BLD: 1.4 10*3/UL (ref 1–3.2)
LYMPHOCYTES NFR BLD AUTO: 17.9 % (ref 18–39.1)
MCH RBC QN AUTO: 30.2 PG (ref 28–32)
MCHC RBC AUTO-ENTMCNC: 31.9 G/DL (ref 31–35)
MCV RBC AUTO: 94.8 FL (ref 81–99)
MONOCYTES # BLD AUTO: 0.8 10*3/UL (ref 0.2–0.8)
MONOCYTES NFR BLD AUTO: 9.8 % (ref 4.4–11.3)
NEUTS SEG NFR BLD AUTO: 70.1 % (ref 38.7–80)
NITRITE UR QL STRIP.AUTO: NEGATIVE
PLATELET # BLD AUTO: 263 X10E3/UL (ref 140–360)
POTASSIUM SERPL-SCNC: 4.5 MMOL/L (ref 3.5–5.1)
PROT UR QL STRIP.AUTO: (no result)
RBC # BLD AUTO: 5.03 X10E6/UL (ref 3.6–5.1)
SODIUM SERPL-SCNC: 143 MMOL/L (ref 136–145)
SP GR UR STRIP: 1.02 (ref 1.01–1.02)
UROBILINOGEN UR STRIP-MCNC: 0.2 MG/DL (ref 0.2–1)
WBC #/AREA URNS HPF: >50 /HPF (ref 0–5)

## 2023-03-03 PROCEDURE — 85025 COMPLETE CBC W/AUTO DIFF WBC: CPT

## 2023-03-03 PROCEDURE — 81001 URINALYSIS AUTO W/SCOPE: CPT

## 2023-03-03 PROCEDURE — 99283 EMERGENCY DEPT VISIT LOW MDM: CPT

## 2023-03-03 PROCEDURE — 87186 SC STD MICRODIL/AGAR DIL: CPT

## 2023-03-03 PROCEDURE — 36415 COLL VENOUS BLD VENIPUNCTURE: CPT

## 2023-03-03 PROCEDURE — 80053 COMPREHEN METABOLIC PANEL: CPT

## 2023-03-03 PROCEDURE — 87086 URINE CULTURE/COLONY COUNT: CPT
